# Patient Record
Sex: MALE | Race: WHITE | URBAN - METROPOLITAN AREA
[De-identification: names, ages, dates, MRNs, and addresses within clinical notes are randomized per-mention and may not be internally consistent; named-entity substitution may affect disease eponyms.]

---

## 2022-11-13 ENCOUNTER — APPOINTMENT (OUTPATIENT)
Dept: GENERAL RADIOLOGY | Age: 67
DRG: 070 | End: 2022-11-13
Payer: MEDICARE

## 2022-11-13 ENCOUNTER — HOSPITAL ENCOUNTER (INPATIENT)
Age: 67
LOS: 9 days | Discharge: HOME | DRG: 070 | End: 2022-11-22
Attending: EMERGENCY MEDICINE | Admitting: INTERNAL MEDICINE
Payer: MEDICARE

## 2022-11-13 ENCOUNTER — APPOINTMENT (OUTPATIENT)
Dept: CT IMAGING | Age: 67
DRG: 070 | End: 2022-11-13
Payer: MEDICARE

## 2022-11-13 DIAGNOSIS — E87.70 HYPERVOLEMIA, UNSPECIFIED HYPERVOLEMIA TYPE: Primary | ICD-10-CM

## 2022-11-13 DIAGNOSIS — N18.6 END STAGE RENAL DISEASE (HCC): ICD-10-CM

## 2022-11-13 PROBLEM — E87.5 HYPERKALEMIA: Status: ACTIVE | Noted: 2022-11-13

## 2022-11-13 PROBLEM — Z99.2 ADMISSION FOR DIALYSIS (HCC): Status: ACTIVE | Noted: 2022-11-13

## 2022-11-13 LAB
ABSOLUTE EOS #: <0.03 K/UL (ref 0–0.44)
ABSOLUTE IMMATURE GRANULOCYTE: 0.04 K/UL (ref 0–0.3)
ABSOLUTE LYMPH #: 2.12 K/UL (ref 1.1–3.7)
ABSOLUTE MONO #: 0.69 K/UL (ref 0.1–1.2)
ALBUMIN SERPL-MCNC: 4 G/DL (ref 3.5–5.2)
ALBUMIN/GLOBULIN RATIO: 1.1 (ref 1–2.5)
ALP BLD-CCNC: 122 U/L (ref 40–129)
ALT SERPL-CCNC: 52 U/L (ref 5–41)
ANION GAP SERPL CALCULATED.3IONS-SCNC: 25 MMOL/L (ref 9–17)
ANION GAP: 12 MMOL/L (ref 7–16)
AST SERPL-CCNC: 29 U/L
BASOPHILS # BLD: 0 % (ref 0–2)
BASOPHILS ABSOLUTE: 0.04 K/UL (ref 0–0.2)
BETA-HYDROXYBUTYRATE: 0.12 MMOL/L (ref 0.02–0.27)
BETA-HYDROXYBUTYRATE: 0.91 MMOL/L (ref 0.02–0.27)
BILIRUB SERPL-MCNC: 1 MG/DL (ref 0.3–1.2)
BUN BLDV-MCNC: 94 MG/DL (ref 8–23)
CALCIUM SERPL-MCNC: 8.3 MG/DL (ref 8.6–10.4)
CHLORIDE BLD-SCNC: 93 MMOL/L (ref 98–107)
CO2: 16 MMOL/L (ref 20–31)
CREAT SERPL-MCNC: 14.73 MG/DL (ref 0.7–1.2)
EGFR, POC: 4 ML/MIN/1.73M2
EOSINOPHILS RELATIVE PERCENT: 0 % (ref 1–4)
GFR SERPL CREATININE-BSD FRML MDRD: 3 ML/MIN/1.73M2
GLUCOSE BLD-MCNC: 161 MG/DL (ref 75–110)
GLUCOSE BLD-MCNC: 185 MG/DL
GLUCOSE BLD-MCNC: 185 MG/DL (ref 75–110)
GLUCOSE BLD-MCNC: 206 MG/DL
GLUCOSE BLD-MCNC: 206 MG/DL (ref 75–110)
GLUCOSE BLD-MCNC: 256 MG/DL
GLUCOSE BLD-MCNC: 256 MG/DL (ref 75–110)
GLUCOSE BLD-MCNC: 296 MG/DL (ref 74–100)
GLUCOSE BLD-MCNC: 332 MG/DL (ref 70–99)
HCO3 VENOUS: 18.6 MMOL/L (ref 22–29)
HCT VFR BLD CALC: 29.6 % (ref 40.7–50.3)
HEMOGLOBIN: 9.6 G/DL (ref 13–17)
IMMATURE GRANULOCYTES: 0 %
IRON SATURATION: 10 % (ref 20–55)
IRON: 27 UG/DL (ref 59–158)
LIPASE: 87 U/L (ref 13–60)
LYMPHOCYTES # BLD: 22 % (ref 24–43)
MCH RBC QN AUTO: 31.4 PG (ref 25.2–33.5)
MCHC RBC AUTO-ENTMCNC: 32.4 G/DL (ref 28.4–34.8)
MCV RBC AUTO: 96.7 FL (ref 82.6–102.9)
MONOCYTES # BLD: 7 % (ref 3–12)
MYOGLOBIN: 338 NG/ML (ref 28–72)
NEGATIVE BASE EXCESS, VEN: 6 (ref 0–2)
NRBC AUTOMATED: 0 PER 100 WBC
O2 SAT, VEN: 71 % (ref 60–85)
PCO2, VEN: 32.2 MM HG (ref 41–51)
PDW BLD-RTO: 14.4 % (ref 11.8–14.4)
PH VENOUS: 7.37 (ref 7.32–7.43)
PLATELET # BLD: 476 K/UL (ref 138–453)
PMV BLD AUTO: 9.2 FL (ref 8.1–13.5)
PO2, VEN: 37.8 MM HG (ref 30–50)
POC BUN: 105 MG/DL (ref 8–26)
POC CHLORIDE: 104 MMOL/L (ref 98–107)
POC CREATININE: 12.95 MG/DL (ref 0.51–1.19)
POC HEMATOCRIT: 31 % (ref 41–53)
POC HEMOGLOBIN: 10.4 G/DL (ref 13.5–17.5)
POC IONIZED CALCIUM: 0.9 MMOL/L (ref 1.15–1.33)
POC LACTIC ACID: 2.26 MMOL/L (ref 0.56–1.39)
POC POTASSIUM: 5.7 MMOL/L (ref 3.5–4.5)
POC SODIUM: 134 MMOL/L (ref 138–146)
POC TCO2: 19 MMOL/L (ref 22–30)
POTASSIUM SERPL-SCNC: 5.1 MMOL/L (ref 3.7–5.3)
POTASSIUM SERPL-SCNC: 5.8 MMOL/L (ref 3.7–5.3)
PRO-BNP: ABNORMAL PG/ML
RBC # BLD: 3.06 M/UL (ref 4.21–5.77)
SEG NEUTROPHILS: 71 % (ref 36–65)
SEGMENTED NEUTROPHILS ABSOLUTE COUNT: 6.78 K/UL (ref 1.5–8.1)
SODIUM BLD-SCNC: 134 MMOL/L (ref 135–144)
TOTAL CK: 235 U/L (ref 39–308)
TOTAL IRON BINDING CAPACITY: 266 UG/DL (ref 250–450)
TOTAL PROTEIN: 7.6 G/DL (ref 6.4–8.3)
TROPONIN, HIGH SENSITIVITY: 124 NG/L (ref 0–22)
TROPONIN, HIGH SENSITIVITY: 142 NG/L (ref 0–22)
UNSATURATED IRON BINDING CAPACITY: 239 UG/DL (ref 112–347)
WBC # BLD: 9.7 K/UL (ref 3.5–11.3)

## 2022-11-13 PROCEDURE — 99222 1ST HOSP IP/OBS MODERATE 55: CPT | Performed by: INTERNAL MEDICINE

## 2022-11-13 PROCEDURE — 82803 BLOOD GASES ANY COMBINATION: CPT

## 2022-11-13 PROCEDURE — 82010 KETONE BODYS QUAN: CPT

## 2022-11-13 PROCEDURE — 5A1D70Z PERFORMANCE OF URINARY FILTRATION, INTERMITTENT, LESS THAN 6 HOURS PER DAY: ICD-10-PCS | Performed by: INTERNAL MEDICINE

## 2022-11-13 PROCEDURE — 80307 DRUG TEST PRSMV CHEM ANLYZR: CPT

## 2022-11-13 PROCEDURE — 2580000003 HC RX 258: Performed by: STUDENT IN AN ORGANIZED HEALTH CARE EDUCATION/TRAINING PROGRAM

## 2022-11-13 PROCEDURE — 93005 ELECTROCARDIOGRAM TRACING: CPT

## 2022-11-13 PROCEDURE — 84132 ASSAY OF SERUM POTASSIUM: CPT

## 2022-11-13 PROCEDURE — 82947 ASSAY GLUCOSE BLOOD QUANT: CPT

## 2022-11-13 PROCEDURE — 80051 ELECTROLYTE PANEL: CPT

## 2022-11-13 PROCEDURE — 83540 ASSAY OF IRON: CPT

## 2022-11-13 PROCEDURE — 99285 EMERGENCY DEPT VISIT HI MDM: CPT

## 2022-11-13 PROCEDURE — 83690 ASSAY OF LIPASE: CPT

## 2022-11-13 PROCEDURE — 6370000000 HC RX 637 (ALT 250 FOR IP): Performed by: STUDENT IN AN ORGANIZED HEALTH CARE EDUCATION/TRAINING PROGRAM

## 2022-11-13 PROCEDURE — 85014 HEMATOCRIT: CPT

## 2022-11-13 PROCEDURE — 2500000003 HC RX 250 WO HCPCS

## 2022-11-13 PROCEDURE — 82565 ASSAY OF CREATININE: CPT

## 2022-11-13 PROCEDURE — 80053 COMPREHEN METABOLIC PANEL: CPT

## 2022-11-13 PROCEDURE — 90935 HEMODIALYSIS ONE EVALUATION: CPT

## 2022-11-13 PROCEDURE — 82330 ASSAY OF CALCIUM: CPT

## 2022-11-13 PROCEDURE — G0480 DRUG TEST DEF 1-7 CLASSES: HCPCS

## 2022-11-13 PROCEDURE — 99291 CRITICAL CARE FIRST HOUR: CPT | Performed by: INTERNAL MEDICINE

## 2022-11-13 PROCEDURE — 83605 ASSAY OF LACTIC ACID: CPT

## 2022-11-13 PROCEDURE — 83550 IRON BINDING TEST: CPT

## 2022-11-13 PROCEDURE — 82550 ASSAY OF CK (CPK): CPT

## 2022-11-13 PROCEDURE — 85025 COMPLETE CBC W/AUTO DIFF WBC: CPT

## 2022-11-13 PROCEDURE — 80179 DRUG ASSAY SALICYLATE: CPT

## 2022-11-13 PROCEDURE — 70450 CT HEAD/BRAIN W/O DYE: CPT

## 2022-11-13 PROCEDURE — 83874 ASSAY OF MYOGLOBIN: CPT

## 2022-11-13 PROCEDURE — 6360000002 HC RX W HCPCS: Performed by: STUDENT IN AN ORGANIZED HEALTH CARE EDUCATION/TRAINING PROGRAM

## 2022-11-13 PROCEDURE — 80143 DRUG ASSAY ACETAMINOPHEN: CPT

## 2022-11-13 PROCEDURE — 83880 ASSAY OF NATRIURETIC PEPTIDE: CPT

## 2022-11-13 PROCEDURE — 84520 ASSAY OF UREA NITROGEN: CPT

## 2022-11-13 PROCEDURE — 1200000000 HC SEMI PRIVATE

## 2022-11-13 PROCEDURE — 84484 ASSAY OF TROPONIN QUANT: CPT

## 2022-11-13 PROCEDURE — 71045 X-RAY EXAM CHEST 1 VIEW: CPT

## 2022-11-13 RX ORDER — LABETALOL HYDROCHLORIDE 5 MG/ML
10 INJECTION, SOLUTION INTRAVENOUS EVERY 6 HOURS PRN
Status: DISCONTINUED | OUTPATIENT
Start: 2022-11-13 | End: 2022-11-22 | Stop reason: HOSPADM

## 2022-11-13 RX ORDER — HEPARIN SODIUM 5000 [USP'U]/ML
5000 INJECTION, SOLUTION INTRAVENOUS; SUBCUTANEOUS EVERY 8 HOURS SCHEDULED
Status: DISCONTINUED | OUTPATIENT
Start: 2022-11-13 | End: 2022-11-22 | Stop reason: HOSPADM

## 2022-11-13 RX ORDER — INSULIN LISPRO 100 [IU]/ML
0-4 INJECTION, SOLUTION INTRAVENOUS; SUBCUTANEOUS NIGHTLY
Status: DISCONTINUED | OUTPATIENT
Start: 2022-11-13 | End: 2022-11-22 | Stop reason: HOSPADM

## 2022-11-13 RX ORDER — SODIUM CHLORIDE 9 MG/ML
INJECTION, SOLUTION INTRAVENOUS PRN
Status: DISCONTINUED | OUTPATIENT
Start: 2022-11-13 | End: 2022-11-22 | Stop reason: HOSPADM

## 2022-11-13 RX ORDER — ACETAMINOPHEN 325 MG/1
650 TABLET ORAL EVERY 6 HOURS PRN
Status: DISCONTINUED | OUTPATIENT
Start: 2022-11-13 | End: 2022-11-22 | Stop reason: HOSPADM

## 2022-11-13 RX ORDER — ACETAMINOPHEN 650 MG/1
650 SUPPOSITORY RECTAL EVERY 6 HOURS PRN
Status: DISCONTINUED | OUTPATIENT
Start: 2022-11-13 | End: 2022-11-22 | Stop reason: HOSPADM

## 2022-11-13 RX ORDER — ONDANSETRON 4 MG/1
4 TABLET, ORALLY DISINTEGRATING ORAL EVERY 8 HOURS PRN
Status: DISCONTINUED | OUTPATIENT
Start: 2022-11-13 | End: 2022-11-22 | Stop reason: HOSPADM

## 2022-11-13 RX ORDER — SODIUM CHLORIDE 0.9 % (FLUSH) 0.9 %
5-40 SYRINGE (ML) INJECTION PRN
Status: DISCONTINUED | OUTPATIENT
Start: 2022-11-13 | End: 2022-11-22 | Stop reason: HOSPADM

## 2022-11-13 RX ORDER — ASPIRIN 81 MG/1
81 TABLET, CHEWABLE ORAL AS NEEDED
Status: ON HOLD | COMMUNITY
End: 2022-11-22 | Stop reason: SDUPTHER

## 2022-11-13 RX ORDER — SODIUM CHLORIDE 0.9 % (FLUSH) 0.9 %
5-40 SYRINGE (ML) INJECTION EVERY 12 HOURS SCHEDULED
Status: DISCONTINUED | OUTPATIENT
Start: 2022-11-13 | End: 2022-11-22 | Stop reason: HOSPADM

## 2022-11-13 RX ORDER — DEXTROSE MONOHYDRATE 100 MG/ML
INJECTION, SOLUTION INTRAVENOUS CONTINUOUS PRN
Status: DISCONTINUED | OUTPATIENT
Start: 2022-11-13 | End: 2022-11-22 | Stop reason: HOSPADM

## 2022-11-13 RX ORDER — POLYETHYLENE GLYCOL 3350 17 G/17G
17 POWDER, FOR SOLUTION ORAL DAILY PRN
Status: DISCONTINUED | OUTPATIENT
Start: 2022-11-13 | End: 2022-11-22 | Stop reason: HOSPADM

## 2022-11-13 RX ORDER — INSULIN LISPRO 100 [IU]/ML
0-8 INJECTION, SOLUTION INTRAVENOUS; SUBCUTANEOUS
Status: DISCONTINUED | OUTPATIENT
Start: 2022-11-13 | End: 2022-11-22 | Stop reason: HOSPADM

## 2022-11-13 RX ORDER — ONDANSETRON 2 MG/ML
4 INJECTION INTRAMUSCULAR; INTRAVENOUS EVERY 6 HOURS PRN
Status: DISCONTINUED | OUTPATIENT
Start: 2022-11-13 | End: 2022-11-22 | Stop reason: HOSPADM

## 2022-11-13 RX ADMIN — DEXTROSE MONOHYDRATE 250 ML: 100 INJECTION, SOLUTION INTRAVENOUS at 11:38

## 2022-11-13 RX ADMIN — INSULIN LISPRO 4 UNITS: 100 INJECTION, SOLUTION INTRAVENOUS; SUBCUTANEOUS at 13:37

## 2022-11-13 RX ADMIN — Medication 10 MG: at 21:21

## 2022-11-13 RX ADMIN — INSULIN HUMAN 10 UNITS: 100 INJECTION, SOLUTION PARENTERAL at 11:39

## 2022-11-13 RX ADMIN — SODIUM CHLORIDE, PRESERVATIVE FREE 10 ML: 5 INJECTION INTRAVENOUS at 21:19

## 2022-11-13 RX ADMIN — HEPARIN SODIUM 5000 UNITS: 5000 INJECTION INTRAVENOUS; SUBCUTANEOUS at 17:26

## 2022-11-13 ASSESSMENT — ENCOUNTER SYMPTOMS
CONSTIPATION: 0
NAUSEA: 0
COUGH: 0
NAUSEA: 1
VOMITING: 0
SHORTNESS OF BREATH: 0
EYE PAIN: 0
VOMITING: 1
SHORTNESS OF BREATH: 1
VOICE CHANGE: 0
EYE REDNESS: 0
DIARRHEA: 0
CHEST TIGHTNESS: 0
FACIAL SWELLING: 0
BACK PAIN: 0
ABDOMINAL PAIN: 0
EYE DISCHARGE: 0
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
## 2022-11-13 NOTE — ED NOTES
Pt states he is feeling a bit better. Dialysis at bedside still on going.       Ruma Sanford RN  11/13/22 2823

## 2022-11-13 NOTE — ED NOTES
Patient attached to nasal cannulla at 2lpm, o2 sat is constantly on 80's.         Farley Cogan, RN  11/13/22 6089

## 2022-11-13 NOTE — ED NOTES
Nephrology at bedside      Gwen Whitfield [facility]  11/13/22 6427

## 2022-11-13 NOTE — ED NOTES
Pt report given to [facility].       Leatha Cox RN  11/13/22 8280

## 2022-11-13 NOTE — ED NOTES
Pt is moved to Rm 19 via stretcher. Call light given, no needs requested as of this time.       Emely Garibay RN  11/13/22 5782

## 2022-11-13 NOTE — PROGRESS NOTES
Dialysis Post Treatment Note  Vitals:    11/13/22 1750   BP: (!) 190/93   Pulse: (!) 115   Resp: 19   Temp: 99.2 °F (37.3 °C)   SpO2:      Pre-Weight = KENJI  Post-weight = Weight:  (KENJI (pt. is on a stretcher in ER room))  Total Liters Processed = Blood Volume Processed (Liters): 79.5 l/min  Rinseback Volume (mL) = Rinseback Volume (ml): 300 ml  Net Removal (mL) =  3000mL  Patient's dry weight=81kg  Type of access used=AVF left  Length of treatment=210mins    Pt. Tolerated HD tx. Able to remove 3L of fluid as per orders. No adverse events noted pre, intra, and post HD treatment. Post HD access care rendered. Pt. Remained in ER bed post HD, but is for admission awaiting room. Report given to nurse Evelyn Mackey RN of ER.

## 2022-11-13 NOTE — ED NOTES
Pt insisted to use the bathroom, ambulated with assistance and using his cane.,     Rex Tirado RN  11/13/22 1811

## 2022-11-13 NOTE — ED TRIAGE NOTES
Pt to ED with c/o of high blood sugar. Pt has a fistula on lefr arm and on dialysis 3x/wk as reported. Pt has not been dialyzed for over a week. Pt reported he is insulin dependent diabetic, hypertensive and has history of stroke. Pt is a non-smoker, has history of heart bypass.

## 2022-11-13 NOTE — ED NOTES
Pt changes into hospital gown and provided fresh warm blanket. Box lunch given as requested.       Yasmeen Lechuga RN  11/13/22 1038

## 2022-11-13 NOTE — ED PROVIDER NOTES
[address] ED     Emergency Department     Faculty Attestation        I performed a history and physical examination of the patient and discussed management with the resident. I reviewed the residents note and agree with the documented findings and plan of care. Any areas of disagreement are noted on the chart. I was personally present for the key portions of any procedures. I have documented in the chart those procedures where I was not present during the key portions. I have reviewed the emergency nurses triage note. I agree with the chief complaint, past medical history, past surgical history, allergies, medications, social and family history as documented unless otherwise noted below. For Physician Assistant/ Nurse Practitioner cases/documentation I have personally evaluated this patient and have completed at least one if not all key elements of the E/M (history, physical exam, and MDM). Additional findings are as noted. Vital Signs: BP: (!) 174/81  Heart Rate: 92  Resp: 21  Temp: 98.4 °F (36.9 °C) SpO2: 97 %  PCP:  No primary care provider on file. Pertinent Comments:           EKG Interpretation    Interpreted by emergency department physician    Rhythm: normal sinus   Rate: normal at 93 bpm  Axis: normal  Conduction: normal  ST Segments: no acute change  T Waves: Lateral T wave inversions  Q Waves: no acute change    Clinical Impression:  nonspecific EKG. Critical Care  None      (Please note that portions of this note were completed with a voice recognition program. Efforts were made to edit the dictations but occasionally words are mis-transcribed.  Whenever words are used in this note in any gender, they shall be construed as though they were used in the gender appropriate to the circumstances; and whenever words are used in this note in the singular or plural form, they shall be construed as though they were used in the form appropriate to the circumstances.)    MD Juan Miguel Melendez  Attending Emergency Medicine Physician            Eliz Rose MD  11/13/22 0897
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
## 2022-11-13 NOTE — ED NOTES
Critical results of BUN 94, CREA 14.73. Resident notified.       Michael Lazcano, MACHO  11/13/22 3156

## 2022-11-13 NOTE — PROGRESS NOTES
Dialysis Time Out  To be done by RN and tech or 2 RNs  Staff Names Jacinto Ureña RN / Lasha Hill RN    [x]  Identity of the patient using 2 patient identifiers  [x]  Consent for treatment  [x]  Equipment-proper machine and dialyzer  [x]  B-Hep B status  [x]  Orders- to include bath, blood flow, dialyzer, time and fluid removal  [x]  Access-Correct site and in working order  [x]  Time for patient to ask questions.
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
## 2022-11-13 NOTE — ED NOTES
Pt also complains of weakness on lower extremities bilateral, denies tingling sensation. Bilateral pitting edema noted on both legs.       Heide Jennings RN  11/13/22 1624 [place] Pelon Mckeon RN  11/13/22 0380

## 2022-11-13 NOTE — ED NOTES
Internal medicine at bedside to evaluate pt. Pt is not cooperating with examination, he is sarcastic, will not provide factual information and is making it hard to evaluate mental status. Pt refusing to answer specific questions and has demanding personality.      Cristhian Ruggiero RN  11/13/22 0033

## 2022-11-14 PROBLEM — R79.89 ELEVATED BRAIN NATRIURETIC PEPTIDE (BNP) LEVEL: Status: ACTIVE | Noted: 2022-11-14

## 2022-11-14 PROBLEM — G93.41 ACUTE METABOLIC ENCEPHALOPATHY: Status: ACTIVE | Noted: 2022-11-14

## 2022-11-14 PROBLEM — E11.40 DIABETIC NEUROPATHY (HCC): Status: ACTIVE | Noted: 2022-11-14

## 2022-11-14 PROBLEM — I10 PRIMARY HYPERTENSION: Status: ACTIVE | Noted: 2022-11-14

## 2022-11-14 PROBLEM — G93.49 UREMIC ENCEPHALOPATHY: Status: ACTIVE | Noted: 2022-11-14

## 2022-11-14 PROBLEM — Z79.4 INSULIN DEPENDENT TYPE 2 DIABETES MELLITUS (HCC): Status: ACTIVE | Noted: 2022-11-14

## 2022-11-14 PROBLEM — D50.9 IRON DEFICIENCY ANEMIA: Status: ACTIVE | Noted: 2022-11-14

## 2022-11-14 PROBLEM — N19 UREMIA: Status: ACTIVE | Noted: 2022-11-14

## 2022-11-14 PROBLEM — E11.9 INSULIN DEPENDENT TYPE 2 DIABETES MELLITUS (HCC): Status: ACTIVE | Noted: 2022-11-14

## 2022-11-14 PROBLEM — N19 UREMIC ENCEPHALOPATHY: Status: ACTIVE | Noted: 2022-11-14

## 2022-11-14 LAB
ABSOLUTE EOS #: 0.27 K/UL (ref 0–0.44)
ABSOLUTE IMMATURE GRANULOCYTE: <0.03 K/UL (ref 0–0.3)
ABSOLUTE LYMPH #: 2.05 K/UL (ref 1.1–3.7)
ABSOLUTE MONO #: 0.61 K/UL (ref 0.1–1.2)
AMMONIA: 28 UMOL/L (ref 16–60)
AMPHETAMINE SCREEN URINE: NEGATIVE
ANION GAP SERPL CALCULATED.3IONS-SCNC: 19 MMOL/L (ref 9–17)
BARBITURATE SCREEN URINE: NEGATIVE
BASOPHILS # BLD: 1 % (ref 0–2)
BASOPHILS ABSOLUTE: 0.05 K/UL (ref 0–0.2)
BENZODIAZEPINE SCREEN, URINE: NEGATIVE
BUN BLDV-MCNC: 44 MG/DL (ref 8–23)
CALCIUM SERPL-MCNC: 8.8 MG/DL (ref 8.6–10.4)
CANNABINOID SCREEN URINE: NEGATIVE
CHLORIDE BLD-SCNC: 92 MMOL/L (ref 98–107)
CO2: 25 MMOL/L (ref 20–31)
COCAINE METABOLITE, URINE: NEGATIVE
CREAT SERPL-MCNC: 8.77 MG/DL (ref 0.7–1.2)
EOSINOPHILS RELATIVE PERCENT: 4 % (ref 1–4)
FENTANYL URINE: NEGATIVE
GFR SERPL CREATININE-BSD FRML MDRD: 6 ML/MIN/1.73M2
GLUCOSE BLD-MCNC: 163 MG/DL (ref 70–99)
GLUCOSE BLD-MCNC: 176 MG/DL (ref 75–110)
GLUCOSE BLD-MCNC: 232 MG/DL (ref 75–110)
GLUCOSE BLD-MCNC: 254 MG/DL (ref 75–110)
GLUCOSE BLD-MCNC: 254 MG/DL (ref 75–110)
HCT VFR BLD CALC: 29.7 % (ref 40.7–50.3)
HEMOGLOBIN: 10 G/DL (ref 13–17)
IMMATURE GRANULOCYTES: 0 %
INR BLD: 1
LACTIC ACID, SEPSIS WHOLE BLOOD: 2.3 MMOL/L (ref 0.5–1.9)
LYMPHOCYTES # BLD: 32 % (ref 24–43)
MCH RBC QN AUTO: 32.2 PG (ref 25.2–33.5)
MCHC RBC AUTO-ENTMCNC: 33.7 G/DL (ref 28.4–34.8)
MCV RBC AUTO: 95.5 FL (ref 82.6–102.9)
METHADONE SCREEN, URINE: NEGATIVE
MONOCYTES # BLD: 10 % (ref 3–12)
NRBC AUTOMATED: 0 PER 100 WBC
OPIATES, URINE: NEGATIVE
OXYCODONE SCREEN URINE: NEGATIVE
PDW BLD-RTO: 13.9 % (ref 11.8–14.4)
PHENCYCLIDINE, URINE: NEGATIVE
PLATELET # BLD: 437 K/UL (ref 138–453)
PMV BLD AUTO: 9.1 FL (ref 8.1–13.5)
POTASSIUM SERPL-SCNC: 4 MMOL/L (ref 3.7–5.3)
PROTHROMBIN TIME: 11.1 SEC (ref 9.1–12.3)
RBC # BLD: 3.11 M/UL (ref 4.21–5.77)
SEG NEUTROPHILS: 53 % (ref 36–65)
SEGMENTED NEUTROPHILS ABSOLUTE COUNT: 3.42 K/UL (ref 1.5–8.1)
SODIUM BLD-SCNC: 136 MMOL/L (ref 135–144)
TEST INFORMATION: NORMAL
WBC # BLD: 6.4 K/UL (ref 3.5–11.3)

## 2022-11-14 PROCEDURE — 2580000003 HC RX 258: Performed by: STUDENT IN AN ORGANIZED HEALTH CARE EDUCATION/TRAINING PROGRAM

## 2022-11-14 PROCEDURE — 36415 COLL VENOUS BLD VENIPUNCTURE: CPT

## 2022-11-14 PROCEDURE — 82947 ASSAY GLUCOSE BLOOD QUANT: CPT

## 2022-11-14 PROCEDURE — 6360000002 HC RX W HCPCS: Performed by: STUDENT IN AN ORGANIZED HEALTH CARE EDUCATION/TRAINING PROGRAM

## 2022-11-14 PROCEDURE — 1200000000 HC SEMI PRIVATE

## 2022-11-14 PROCEDURE — 83605 ASSAY OF LACTIC ACID: CPT

## 2022-11-14 PROCEDURE — 6370000000 HC RX 637 (ALT 250 FOR IP)

## 2022-11-14 PROCEDURE — 80307 DRUG TEST PRSMV CHEM ANLYZR: CPT

## 2022-11-14 PROCEDURE — 6370000000 HC RX 637 (ALT 250 FOR IP): Performed by: STUDENT IN AN ORGANIZED HEALTH CARE EDUCATION/TRAINING PROGRAM

## 2022-11-14 PROCEDURE — 80048 BASIC METABOLIC PNL TOTAL CA: CPT

## 2022-11-14 PROCEDURE — 82140 ASSAY OF AMMONIA: CPT

## 2022-11-14 PROCEDURE — 6370000000 HC RX 637 (ALT 250 FOR IP): Performed by: INTERNAL MEDICINE

## 2022-11-14 PROCEDURE — 85025 COMPLETE CBC W/AUTO DIFF WBC: CPT

## 2022-11-14 PROCEDURE — 90935 HEMODIALYSIS ONE EVALUATION: CPT

## 2022-11-14 PROCEDURE — 85610 PROTHROMBIN TIME: CPT

## 2022-11-14 PROCEDURE — 90935 HEMODIALYSIS ONE EVALUATION: CPT | Performed by: INTERNAL MEDICINE

## 2022-11-14 PROCEDURE — 99233 SBSQ HOSP IP/OBS HIGH 50: CPT | Performed by: INTERNAL MEDICINE

## 2022-11-14 RX ORDER — AMLODIPINE BESYLATE 10 MG/1
10 TABLET ORAL DAILY
Status: DISCONTINUED | OUTPATIENT
Start: 2022-11-14 | End: 2022-11-22 | Stop reason: HOSPADM

## 2022-11-14 RX ORDER — CARVEDILOL 12.5 MG/1
12.5 TABLET ORAL 2 TIMES DAILY WITH MEALS
Status: DISCONTINUED | OUTPATIENT
Start: 2022-11-14 | End: 2022-11-14

## 2022-11-14 RX ORDER — CARVEDILOL 25 MG/1
25 TABLET ORAL 2 TIMES DAILY WITH MEALS
Status: DISCONTINUED | OUTPATIENT
Start: 2022-11-14 | End: 2022-11-22 | Stop reason: HOSPADM

## 2022-11-14 RX ORDER — LANOLIN ALCOHOL/MO/W.PET/CERES
325 CREAM (GRAM) TOPICAL
Status: DISCONTINUED | OUTPATIENT
Start: 2022-11-14 | End: 2022-11-22 | Stop reason: HOSPADM

## 2022-11-14 RX ADMIN — CARVEDILOL 12.5 MG: 12.5 TABLET, FILM COATED ORAL at 08:44

## 2022-11-14 RX ADMIN — INSULIN LISPRO 4 UNITS: 100 INJECTION, SOLUTION INTRAVENOUS; SUBCUTANEOUS at 17:32

## 2022-11-14 RX ADMIN — SODIUM CHLORIDE, PRESERVATIVE FREE 10 ML: 5 INJECTION INTRAVENOUS at 21:15

## 2022-11-14 RX ADMIN — FERROUS SULFATE TAB EC 325 MG (65 MG FE EQUIVALENT) 325 MG: 325 (65 FE) TABLET DELAYED RESPONSE at 08:44

## 2022-11-14 RX ADMIN — SODIUM CHLORIDE, PRESERVATIVE FREE 10 ML: 5 INJECTION INTRAVENOUS at 08:45

## 2022-11-14 RX ADMIN — INSULIN LISPRO 2 UNITS: 100 INJECTION, SOLUTION INTRAVENOUS; SUBCUTANEOUS at 08:47

## 2022-11-14 RX ADMIN — CARVEDILOL 25 MG: 25 TABLET, FILM COATED ORAL at 17:32

## 2022-11-14 RX ADMIN — HEPARIN SODIUM 5000 UNITS: 5000 INJECTION INTRAVENOUS; SUBCUTANEOUS at 05:58

## 2022-11-14 RX ADMIN — AMLODIPINE BESYLATE 10 MG: 10 TABLET ORAL at 08:44
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
## 2022-11-14 NOTE — PROGRESS NOTES
Dialysis Post Treatment Note  Vitals:    11/14/22 1324   BP: (!) 162/84   Pulse: 91   Resp: 16   Temp: 97.4 °F (36.3 °C)   SpO2:      Pre-Weight = 79.1 kg  Post-weight = Weight: 171 lb 1.2 oz (77.6 kg)  Total Liters Processed = Blood Volume Processed (Liters): 300 l/min  Rinseback Volume (mL) = Rinseback Volume (ml): 75.9 ml  Net Removal (mL) =  1.5L   Patient's dry weight=  Type of access used= LUE Fistula   Length of treatment=3 hrs    Pt tolerated tx well today with no complaints. Pt was seen bedside today by Dr. Ricky Cordova today with no changes to his orders. Pt discharged back to room via transportation.

## 2022-11-14 NOTE — PLAN OF CARE
Problem: ABCDS Injury Assessment  Goal: Absence of physical injury  11/14/2022 0543 by Alondra Richter RN  Outcome: Progressing  11/13/2022 1854 by Natalee Payton RN  Outcome: Progressing     Problem: Safety - Adult  Goal: Free from fall injury  11/14/2022 0543 by Alondra Richter RN  Outcome: Progressing  11/13/2022 1854 by Natalee Payton RN  Outcome: Progressing     Problem: Skin/Tissue Integrity  Goal: Absence of new skin breakdown  Description: 1. Monitor for areas of redness and/or skin breakdown  2. Assess vascular access sites hourly  3. Every 4-6 hours minimum:  Change oxygen saturation probe site  4. Every 4-6 hours:  If on nasal continuous positive airway pressure, respiratory therapy assess nares and determine need for appliance change or resting period.   Outcome: Progressing

## 2022-11-14 NOTE — PROGRESS NOTES
Dialysis Time Out  To be done by RN and tech or 2 RNs  Staff Names Gillian Augustin RN    [x]  Identity of the patient using 2 patient identifiers  [x]  Consent for treatment  [x]  Equipment-proper machine and dialyzer  [x]  B-Hep B status  [x]  Orders- to include bath, blood flow, dialyzer, time and fluid removal  [x]  Access-Correct site and in working order  [x]  Time for patient to ask questions.
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
## 2022-11-14 NOTE — PLAN OF CARE
Problem: ABCDS Injury Assessment  Goal: Absence of physical injury  11/14/2022 1645 by Juan Warren RN  Outcome: Progressing  11/14/2022 0543 by Macario Asif RN  Outcome: Progressing     Problem: Safety - Adult  Goal: Free from fall injury  11/14/2022 1645 by Juan Warren RN  Outcome: Progressing  11/14/2022 0543 by Macario Asif RN  Outcome: Progressing     Problem: Skin/Tissue Integrity  Goal: Absence of new skin breakdown  Description: 1. Monitor for areas of redness and/or skin breakdown  2. Assess vascular access sites hourly  3. Every 4-6 hours minimum:  Change oxygen saturation probe site  4. Every 4-6 hours:  If on nasal continuous positive airway pressure, respiratory therapy assess nares and determine need for appliance change or resting period.   11/14/2022 1645 by Juan Warren RN  Outcome: Progressing  11/14/2022 0543 by Macario Asif RN  Outcome: Progressing     Problem: Chronic Conditions and Co-morbidities  Goal: Patient's chronic conditions and co-morbidity symptoms are monitored and maintained or improved  Outcome: Progressing
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
## 2022-11-14 NOTE — PROGRESS NOTES
Physical Therapy        Physical Therapy Cancel Note      DATE: 2022    NAME: Tamiko Cummings  MRN: [id number]   : 1955      Patient not seen this date for Physical Therapy due to:    Hemodialysis:Out of room at dialysis upon PT checking in.  Ck 11/15      Electronically signed by Jn Clark PT on 2022 at 3:33 PM

## 2022-11-14 NOTE — PROGRESS NOTES
Renal Progress Note    Patient :  Rosmery Anderson; 73 y.o. MRN# [id number]  Location:  [facility]  Attending:  Deonna Jean MD  Admit Date:  2022   Hospital Day: 1      Subjective:       Patient is seen and examined at bedside. No significant event overnight. Patient has a hemodialysis via left aVF yesterday with net removal of 3 L 4 to 10 minutes. Tolerated procedure well. Blood pressure today is 150/86, pulse 100 on room air. Labs reviewed and evaluated. Sodium 136 potassium 4.0 chloride 92. Hemoglobin 10.0-stable. Outpatient Medications:     Medications Prior to Admission: aspirin 81 MG chewable tablet, Take 81 mg by mouth as needed for Pain    Current Medications:     Scheduled Meds:    carvedilol  12.5 mg Oral BID WC    amLODIPine  10 mg Oral Daily    ferrous sulfate  325 mg Oral Daily with breakfast    sodium chloride flush  5-40 mL IntraVENous 2 times per day    heparin (porcine)  5,000 Units SubCUTAneous 3 times per day    insulin lispro  0-8 Units SubCUTAneous TID WC    insulin lispro  0-4 Units SubCUTAneous Nightly     Continuous Infusions:    dextrose      sodium chloride       PRN Meds:  glucose, dextrose bolus **OR** dextrose bolus, glucagon (rDNA), dextrose, sodium chloride flush, sodium chloride, ondansetron **OR** ondansetron, polyethylene glycol, acetaminophen **OR** acetaminophen, labetalol    Input/Output:       I/O last 3 completed shifts: In: 600 [P.O.:300]  Out: 8681 [Urine:175].     Patient Vitals for the past 96 hrs (Last 3 readings):   Weight   22 1836 176 lb 12.9 oz (80.2 kg)       Vital Signs:   Temperature:  Temp: 98.2 °F (36.8 °C)  TMax:   Temp (24hrs), Av.9 °F (37.2 °C), Min:98.2 °F (36.8 °C), Max:99.2 °F (37.3 °C)    Respirations:  Resp: 16  Pulse:   Heart Rate: 96  BP:    BP: (!) 155/82  BP Range: Systolic (39OFW), OIN:931 , Min:146 , FHF:261       Diastolic (60CPG), CLW:37, Min:76, Max:104      Physical Examination:     General:  AAO x 3, speaking in full sentences, no accessory muscle use. HEENT: Atraumatic, normocephalic, no throat congestion, moist mucosa. Eyes:   Pupils equal, round and reactive to light, EOMI. Neck:   Supple  Chest:   Bilateral vesicular breath sounds, no rales or wheezes. Cardiac:  S1 S2 RR, no murmurs, gallops or rubs. Abdomen: Soft, non-tender, no masses or organomegaly, BS audible. :   No suprapubic or flank tenderness. Neuro:  AAO x 3, No FND. SKIN:  No rashes, good skin turgor. Extremities:  No edema. Labs:       Recent Labs     11/13/22  0920 11/14/22  0451   WBC 9.7 6.4   RBC 3.06* 3.11*   HGB 9.6* 10.0*   HCT 29.6* 29.7*   MCV 96.7 95.5   MCH 31.4 32.2   MCHC 32.4 33.7   RDW 14.4 13.9   * 437   MPV 9.2 9.1      BMP:   Recent Labs     11/13/22  0912 11/13/22  0920 11/13/22  1244 11/13/22  1414 11/13/22  1530 11/13/22  1643 11/14/22  0451   NA  --  134*  --   --   --   --  136   K  --  5.8*  --  5.1  --   --  4.0   CL  --  93*  --   --   --   --  92*   CO2  --  16*  --   --   --   --  25   BUN  --  94*  --   --   --   --  44*   CREATININE 12.95* 14.73*  --   --   --   --  8.77*   GLUCOSE  --  332*   < >  --  206 185 163*   CALCIUM  --  8.3*  --   --   --   --  8.8    < > = values in this interval not displayed. Phosphorus:   No results for input(s): PHOS in the last 72 hours. Magnesium:  No results for input(s): MG in the last 72 hours. Albumin:    Recent Labs     11/13/22  0920   LABALBU 4.0     BNP:    No results found for: BNP  KEN:    No results found for: KEN  SPEP:  Lab Results   Component Value Date/Time    PROT 7.6 11/13/2022 09:20 AM       Urinalysis/Chemistries:    No results found for: Florajacky Fast, PHUR, LABCAST, [address], [address], MUCUS, TRICHOMONAS, YEAST, BACTERIA, CLARITYU, SPECGRAV, LEUKOCYTESUR, UROBILINOGEN, BILIRUBINUR, BLOODU, GLUCOSEU, [address] [facility], [address] [place] [address]    Radiology:     Reviewed. Assessment:     1. ESRD on Hemodialysis.  His regular HD days are Monday Wednesday Friday Hemodialysis facility in [place], he is unsure of the name. He is using left upper arm fistula. He is unsure of the name of his nephrologist.   He has HD yesterday with net removal of 3 L for 3 and half hours    2. Anemia of chronic disease  3. Secondary hyperparathyroidism  4. Hypertension  5. Diabetes type 2  6. Hyperkalemia-improved    Plan:   1.  HD per schedule MWF.  2.  Strict I's and O's with daily weight. 3.  Low potassium low phosphorus diet. Fluid restriction to 1200 mL/day. 4.  We will follow with you. Nutrition   Please ensure that patient is on a renal diet/TF. Avoid nephrotoxic drugs/contrast exposure. Irina Ray MD  Internal Medicine Resident, PGY-2  [address]  11/14/2022,9:18 AM    This note is created with the assistance of a speech-recognition program. While intending to generate a document that actually reflects the content of the visit, no guarantees can be provided that every mistake has been identified and corrected by editing. Attending Physician Statement  I have discussed the care of Vicki Kumar, including pertinent history and exam findings with the resident/fellow. I have reviewed the key elements of all parts of the encounter with the resident/fellow. I have seen and examined the patient with the resident/fellow. I have seen and examined the patent on hemodialysis. I have reviewed the dialysis prescription with dialysis personnel in the dialysis unit. I agree with the assessment and plan and status of the problem list as documented. Addiitionally I recommend continuing a Monday and Wednesday and Friday hemodialysis schedule.     Delilah Salmon MD   Nephrology Attending Physician  [facility]  11/14/2022
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
## 2022-11-15 PROBLEM — R11.2 NAUSEA AND VOMITING: Status: ACTIVE | Noted: 2022-11-15

## 2022-11-15 PROBLEM — Z99.2 ESRD ON DIALYSIS (HCC): Status: ACTIVE | Noted: 2022-11-13

## 2022-11-15 PROBLEM — D63.8 ANEMIA OF CHRONIC DISEASE: Status: ACTIVE | Noted: 2022-11-15

## 2022-11-15 LAB
ABSOLUTE EOS #: 0.67 K/UL (ref 0–0.44)
ABSOLUTE IMMATURE GRANULOCYTE: <0.03 K/UL (ref 0–0.3)
ABSOLUTE LYMPH #: 2.45 K/UL (ref 1.1–3.7)
ABSOLUTE MONO #: 0.7 K/UL (ref 0.1–1.2)
ANION GAP SERPL CALCULATED.3IONS-SCNC: 13 MMOL/L (ref 9–17)
BASOPHILS # BLD: 1 % (ref 0–2)
BASOPHILS ABSOLUTE: 0.06 K/UL (ref 0–0.2)
BUN BLDV-MCNC: 35 MG/DL (ref 8–23)
CALCIUM SERPL-MCNC: 7.5 MG/DL (ref 8.6–10.4)
CHLORIDE BLD-SCNC: 97 MMOL/L (ref 98–107)
CO2: 27 MMOL/L (ref 20–31)
CREAT SERPL-MCNC: 7.08 MG/DL (ref 0.7–1.2)
EKG ATRIAL RATE: 93 BPM
EKG P AXIS: 43 DEGREES
EKG P-R INTERVAL: 206 MS
EKG Q-T INTERVAL: 394 MS
EKG QRS DURATION: 106 MS
EKG QTC CALCULATION (BAZETT): 489 MS
EKG R AXIS: 1 DEGREES
EKG T AXIS: 107 DEGREES
EKG VENTRICULAR RATE: 93 BPM
EOSINOPHILS RELATIVE PERCENT: 11 % (ref 1–4)
GFR SERPL CREATININE-BSD FRML MDRD: 8 ML/MIN/1.73M2
GLUCOSE BLD-MCNC: 156 MG/DL (ref 75–110)
GLUCOSE BLD-MCNC: 165 MG/DL (ref 70–99)
GLUCOSE BLD-MCNC: 200 MG/DL (ref 75–110)
GLUCOSE BLD-MCNC: 202 MG/DL (ref 75–110)
GLUCOSE BLD-MCNC: 206 MG/DL (ref 75–110)
HCT VFR BLD CALC: 26.3 % (ref 40.7–50.3)
HEMOGLOBIN: 8.7 G/DL (ref 13–17)
IMMATURE GRANULOCYTES: 0 %
LV EF: 48 %
LVEF MODALITY: NORMAL
LYMPHOCYTES # BLD: 39 % (ref 24–43)
MCH RBC QN AUTO: 31.6 PG (ref 25.2–33.5)
MCHC RBC AUTO-ENTMCNC: 33.1 G/DL (ref 28.4–34.8)
MCV RBC AUTO: 95.6 FL (ref 82.6–102.9)
MONOCYTES # BLD: 11 % (ref 3–12)
NRBC AUTOMATED: 0 PER 100 WBC
PDW BLD-RTO: 13.3 % (ref 11.8–14.4)
PLATELET # BLD: 391 K/UL (ref 138–453)
PMV BLD AUTO: 9 FL (ref 8.1–13.5)
POTASSIUM SERPL-SCNC: 4.3 MMOL/L (ref 3.7–5.3)
RBC # BLD: 2.75 M/UL (ref 4.21–5.77)
SEG NEUTROPHILS: 38 % (ref 36–65)
SEGMENTED NEUTROPHILS ABSOLUTE COUNT: 2.36 K/UL (ref 1.5–8.1)
SODIUM BLD-SCNC: 137 MMOL/L (ref 135–144)
WBC # BLD: 6.3 K/UL (ref 3.5–11.3)

## 2022-11-15 PROCEDURE — 80048 BASIC METABOLIC PNL TOTAL CA: CPT

## 2022-11-15 PROCEDURE — 6360000002 HC RX W HCPCS: Performed by: STUDENT IN AN ORGANIZED HEALTH CARE EDUCATION/TRAINING PROGRAM

## 2022-11-15 PROCEDURE — 6370000000 HC RX 637 (ALT 250 FOR IP): Performed by: STUDENT IN AN ORGANIZED HEALTH CARE EDUCATION/TRAINING PROGRAM

## 2022-11-15 PROCEDURE — 85025 COMPLETE CBC W/AUTO DIFF WBC: CPT

## 2022-11-15 PROCEDURE — 93306 TTE W/DOPPLER COMPLETE: CPT

## 2022-11-15 PROCEDURE — 6370000000 HC RX 637 (ALT 250 FOR IP): Performed by: INTERNAL MEDICINE

## 2022-11-15 PROCEDURE — 82947 ASSAY GLUCOSE BLOOD QUANT: CPT

## 2022-11-15 PROCEDURE — 6370000000 HC RX 637 (ALT 250 FOR IP)

## 2022-11-15 PROCEDURE — 97162 PT EVAL MOD COMPLEX 30 MIN: CPT

## 2022-11-15 PROCEDURE — 36415 COLL VENOUS BLD VENIPUNCTURE: CPT

## 2022-11-15 PROCEDURE — 97165 OT EVAL LOW COMPLEX 30 MIN: CPT

## 2022-11-15 PROCEDURE — 99232 SBSQ HOSP IP/OBS MODERATE 35: CPT | Performed by: INTERNAL MEDICINE

## 2022-11-15 PROCEDURE — 93010 ELECTROCARDIOGRAM REPORT: CPT | Performed by: INTERNAL MEDICINE

## 2022-11-15 PROCEDURE — 97530 THERAPEUTIC ACTIVITIES: CPT

## 2022-11-15 PROCEDURE — 2580000003 HC RX 258: Performed by: STUDENT IN AN ORGANIZED HEALTH CARE EDUCATION/TRAINING PROGRAM

## 2022-11-15 PROCEDURE — 99233 SBSQ HOSP IP/OBS HIGH 50: CPT | Performed by: INTERNAL MEDICINE

## 2022-11-15 PROCEDURE — 1200000000 HC SEMI PRIVATE

## 2022-11-15 PROCEDURE — 97535 SELF CARE MNGMENT TRAINING: CPT

## 2022-11-15 RX ORDER — ASPIRIN 81 MG/1
81 TABLET, CHEWABLE ORAL DAILY
Status: DISCONTINUED | OUTPATIENT
Start: 2022-11-15 | End: 2022-11-22 | Stop reason: HOSPADM

## 2022-11-15 RX ADMIN — INSULIN LISPRO 2 UNITS: 100 INJECTION, SOLUTION INTRAVENOUS; SUBCUTANEOUS at 17:41

## 2022-11-15 RX ADMIN — INSULIN LISPRO 2 UNITS: 100 INJECTION, SOLUTION INTRAVENOUS; SUBCUTANEOUS at 13:34

## 2022-11-15 RX ADMIN — ASPIRIN 81 MG: 81 TABLET, CHEWABLE ORAL at 17:41

## 2022-11-15 RX ADMIN — HEPARIN SODIUM 5000 UNITS: 5000 INJECTION INTRAVENOUS; SUBCUTANEOUS at 06:21

## 2022-11-15 RX ADMIN — HEPARIN SODIUM 5000 UNITS: 5000 INJECTION INTRAVENOUS; SUBCUTANEOUS at 13:34

## 2022-11-15 RX ADMIN — FERROUS SULFATE TAB EC 325 MG (65 MG FE EQUIVALENT) 325 MG: 325 (65 FE) TABLET DELAYED RESPONSE at 10:02

## 2022-11-15 RX ADMIN — AMLODIPINE BESYLATE 10 MG: 10 TABLET ORAL at 10:02

## 2022-11-15 RX ADMIN — CARVEDILOL 25 MG: 25 TABLET, FILM COATED ORAL at 17:41

## 2022-11-15 RX ADMIN — SODIUM CHLORIDE, PRESERVATIVE FREE 10 ML: 5 INJECTION INTRAVENOUS at 10:03

## 2022-11-15 RX ADMIN — CARVEDILOL 25 MG: 25 TABLET, FILM COATED ORAL at 10:02

## 2022-11-15 RX ADMIN — SODIUM CHLORIDE, PRESERVATIVE FREE 10 ML: 5 INJECTION INTRAVENOUS at 19:56

## 2022-11-15 RX ADMIN — HEPARIN SODIUM 5000 UNITS: 5000 INJECTION INTRAVENOUS; SUBCUTANEOUS at 21:46
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
## 2022-11-15 NOTE — PROGRESS NOTES
Renal Progress Note    Patient :  Minal Oglesby; 73 y.o. MRN# [id number]  Location:  [facility]  Attending:  Richard Eller MD  Admit Date:  2022   Hospital Day: 2      Subjective:       Patient is seen and examined at bedside. No significant event overnight. Patient had HD via left aVF yesterday with net removal of 3 L. Normally gets HD per F schedule. Tolerated procedure well. Blood pressure today is 136/80, pulse 87 on room air. Labs reviewed and evaluated. Sodium 137 potassium 4.3 chloride 97. Creatinine 7.08 , BUN 35  Hemoglobin 8.7    Outpatient Medications:     Medications Prior to Admission: aspirin 81 MG chewable tablet, Take 81 mg by mouth as needed for Pain    Current Medications:     Scheduled Meds:    amLODIPine  10 mg Oral Daily    ferrous sulfate  325 mg Oral Daily with breakfast    carvedilol  25 mg Oral BID WC    sodium chloride flush  5-40 mL IntraVENous 2 times per day    heparin (porcine)  5,000 Units SubCUTAneous 3 times per day    insulin lispro  0-8 Units SubCUTAneous TID WC    insulin lispro  0-4 Units SubCUTAneous Nightly     Continuous Infusions:    dextrose      sodium chloride       PRN Meds:  hypromellose, glucose, dextrose bolus **OR** dextrose bolus, glucagon (rDNA), dextrose, sodium chloride flush, sodium chloride, ondansetron **OR** ondansetron, polyethylene glycol, acetaminophen **OR** acetaminophen, labetalol    Input/Output:       I/O last 3 completed shifts: In: 600 [P.O.:300]  Out: 1975 [Urine:175].     Patient Vitals for the past 96 hrs (Last 3 readings):   Weight   22 1324 171 lb 1.2 oz (77.6 kg)   22 1013 174 lb 6.1 oz (79.1 kg)   22 1836 176 lb 12.9 oz (80.2 kg)     Vital Signs:   Temperature:  Temp: 97.1 °F (36.2 °C)  TMax:   Temp (24hrs), Av.9 °F (36.6 °C), Min:97.1 °F (36.2 °C), Max:98.8 °F (37.1 °C)    Respirations:  Resp: 17  Pulse:   Heart Rate: 87  BP:    BP: 136/80  BP Range: Systolic (72FAI), KBW:518 , Min:122 , Max:174 Diastolic (81XBZ), AYJ:89, Min:67, Max:87      Physical Examination:     General:  AAO x 3, speaking in full sentences, no accessory muscle use. HEENT: Atraumatic, normocephalic, no throat congestion, moist mucosa. Eyes:   Pupils equal, round and reactive to light, EOMI. Neck:   Supple  Chest:   Bilateral vesicular breath sounds, no rales or wheezes. Cardiac:  S1 S2 RR, no murmurs, gallops or rubs. Abdomen: Soft, non-tender, no masses or organomegaly, BS audible. :   No suprapubic or flank tenderness. Neuro:  AAO x 3, No FND. SKIN:  No rashes, good skin turgor. Extremities:  No edema. Labs:       Recent Labs     11/13/22  0920 11/14/22  0451 11/15/22  0624   WBC 9.7 6.4 6.3   RBC 3.06* 3.11* 2.75*   HGB 9.6* 10.0* 8.7*   HCT 29.6* 29.7* 26.3*   MCV 96.7 95.5 95.6   MCH 31.4 32.2 31.6   MCHC 32.4 33.7 33.1   RDW 14.4 13.9 13.3   * 437 391   MPV 9.2 9.1 9.0      BMP:   Recent Labs     11/13/22  0920 11/13/22  1244 11/13/22  1414 11/13/22  1530 11/13/22  1643 11/14/22  0451 11/15/22  0624   *  --   --   --   --  136 137   K 5.8*  --  5.1  --   --  4.0 4.3   CL 93*  --   --   --   --  92* 97*   CO2 16*  --   --   --   --  25 27   BUN 94*  --   --   --   --  44* 35*   CREATININE 14.73*  --   --   --   --  8.77* 7.08*   GLUCOSE 332*   < >  --    < > 185 163* 165*   CALCIUM 8.3*  --   --   --   --  8.8 7.5*    < > = values in this interval not displayed. Albumin:    Recent Labs     11/13/22  0920   LABALBU 4.0       SPEP:  Lab Results   Component Value Date/Time    PROT 7.6 11/13/2022 09:20 AM     Radiology:     Reviewed. Assessment:     ESRD on Hemodialysis. His regular HD days are Monday Wednesday Friday at [facility] Hemodialysis facility in [address] [place] [address] under Dr. Erica Barclay via is left upper arm fistula. Weakness. Nausea and vomiting. Missed dialysis.   Anemia of chronic disease  Secondary hyperparathyroidism  Hypertension  Diabetes type 2  Hyperkalemia -improved    Plan:   HD per schedule MWF. No acute need for dialysis today. Strict I's and O's with daily weight. Low potassium low phosphorus diet. Fluid restriction to 1200 mL/day.  working towards discharge. BMP in AM.  Will follow. Nutrition   Please ensure that patient is on a renal diet/TF. Avoid nephrotoxic drugs/contrast exposure. Attending Physician Statement  I have discussed the care of this patient, including pertinent history and exam findings, with the Resident/CNP. I have reviewed and edited the key elements of all parts of the encounter with the Resident/CNP. I agree with the assessment, plan and orders as documented by the Resident/CNP. Chris Sethi MD   Nephrology [address] [place] [address] Drive    This note is created with the assistance of a speech-recognition program. While intending to generate a document that actually reflects the content of the visit, no guarantees can be provided that every mistake has been identified and corrected by editing.
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
## 2022-11-15 NOTE — PLAN OF CARE
Tried calling patient's brother-in-law Emery Hagan at [phone] to discuss patient's current clinical condition as well as to request his past medical records. Patient's echocardiogram shows reduced ejection fraction with EF of 45 to 50% and cardiology recommended to obtain previous medical records due to history of CABG and CAD. Left voicemail to call back. Discussed with RN taking care of patient to obtain patient's previous medical records as well.     4:00 PM update: Spoke with patient's brother-in-law Hood regarding patient's previous medical records. He is concerned about patient's [place] [facility] [place] and living situation at his place considering his own family. I spoke to patient again and asked him about his previous cardiology and medical records. He informed me that he followed up at [place], [place] and he gave consent to obtain his previous medical records. I discussed with patient's RN [facility] to obtain medical records and have it faxed over. Will continue to follow up tomorrow on update.      Davy Corcoran MD, MPH, PGY-2  Internal Medicine Resident  [address], [place]

## 2022-11-15 NOTE — PROGRESS NOTES
Cardiology called RN and asked nurse to obtain information from patient's brother. RN asked patient's permission to give his brother a call and obtain information and records regarding patient's heart history and CABG x4. Patient refused permission for us to contact his brother regarding this information and states \"he doesn't know anything. \"
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
## 2022-11-16 LAB
ABSOLUTE EOS #: 0.75 K/UL (ref 0–0.44)
ABSOLUTE IMMATURE GRANULOCYTE: <0.03 K/UL (ref 0–0.3)
ABSOLUTE LYMPH #: 2.25 K/UL (ref 1.1–3.7)
ABSOLUTE MONO #: 0.54 K/UL (ref 0.1–1.2)
ACETAMINOPHEN LEVEL: <5 UG/ML (ref 10–30)
AMPHETAMINE: NEGATIVE NG/ML
ANION GAP SERPL CALCULATED.3IONS-SCNC: 16 MMOL/L (ref 9–17)
BARBITURATES: NEGATIVE NG/ML
BASOPHILS # BLD: 1 % (ref 0–2)
BASOPHILS ABSOLUTE: 0.05 K/UL (ref 0–0.2)
BENZODIAZEPINES: NEGATIVE NG/ML
BUN BLDV-MCNC: 54 MG/DL (ref 8–23)
BUPRENORPHINE: NEGATIVE NG/ML
CALCIUM SERPL-MCNC: 7.5 MG/DL (ref 8.6–10.4)
CHLORIDE BLD-SCNC: 95 MMOL/L (ref 98–107)
CO2: 25 MMOL/L (ref 20–31)
COCAINE: NEGATIVE NG/ML
CREAT SERPL-MCNC: 8.54 MG/DL (ref 0.7–1.2)
DRUGS OF ABUSE COMMENT: ABNORMAL
EOSINOPHILS RELATIVE PERCENT: 14 % (ref 1–4)
ETHANOL PERCENT: <0.01 %
ETHANOL: <10 MG/DL
GFR SERPL CREATININE-BSD FRML MDRD: 6 ML/MIN/1.73M2
GLUCOSE BLD-MCNC: 153 MG/DL (ref 70–99)
GLUCOSE BLD-MCNC: 153 MG/DL (ref 75–110)
GLUCOSE BLD-MCNC: 190 MG/DL (ref 75–110)
GLUCOSE BLD-MCNC: 241 MG/DL (ref 75–110)
HCT VFR BLD CALC: 31.7 % (ref 40.7–50.3)
HEMOGLOBIN: 10.5 G/DL (ref 13–17)
IMMATURE GRANULOCYTES: 0 %
LYMPHOCYTES # BLD: 44 % (ref 24–43)
MCH RBC QN AUTO: 31.5 PG (ref 25.2–33.5)
MCHC RBC AUTO-ENTMCNC: 33.1 G/DL (ref 28.4–34.8)
MCV RBC AUTO: 95.2 FL (ref 82.6–102.9)
METHADONE: NEGATIVE NG/ML
METHAMPHETAMINE: NEGATIVE NG/ML
MONOCYTES # BLD: 10 % (ref 3–12)
NRBC AUTOMATED: 0 PER 100 WBC
OPIATES: NEGATIVE NG/ML
OXYCODONE: NEGATIVE NG/ML
PDW BLD-RTO: 13.4 % (ref 11.8–14.4)
PHENCYCLIDINE: NEGATIVE NG/ML
PLATELET # BLD: 361 K/UL (ref 138–453)
PMV BLD AUTO: 9 FL (ref 8.1–13.5)
POTASSIUM SERPL-SCNC: 4.6 MMOL/L (ref 3.7–5.3)
RBC # BLD: 3.33 M/UL (ref 4.21–5.77)
SALICYLATE LEVEL: <1 MG/DL (ref 3–10)
SEG NEUTROPHILS: 31 % (ref 36–65)
SEGMENTED NEUTROPHILS ABSOLUTE COUNT: 1.65 K/UL (ref 1.5–8.1)
SODIUM BLD-SCNC: 136 MMOL/L (ref 135–144)
THC: NEGATIVE NG/ML
TOXIC TRICYCLIC SC,BLOOD: NEGATIVE
WBC # BLD: 5.3 K/UL (ref 3.5–11.3)

## 2022-11-16 PROCEDURE — 6370000000 HC RX 637 (ALT 250 FOR IP)

## 2022-11-16 PROCEDURE — 82947 ASSAY GLUCOSE BLOOD QUANT: CPT

## 2022-11-16 PROCEDURE — 6370000000 HC RX 637 (ALT 250 FOR IP): Performed by: INTERNAL MEDICINE

## 2022-11-16 PROCEDURE — 36415 COLL VENOUS BLD VENIPUNCTURE: CPT

## 2022-11-16 PROCEDURE — 6360000002 HC RX W HCPCS: Performed by: STUDENT IN AN ORGANIZED HEALTH CARE EDUCATION/TRAINING PROGRAM

## 2022-11-16 PROCEDURE — 99233 SBSQ HOSP IP/OBS HIGH 50: CPT | Performed by: INTERNAL MEDICINE

## 2022-11-16 PROCEDURE — 90935 HEMODIALYSIS ONE EVALUATION: CPT | Performed by: INTERNAL MEDICINE

## 2022-11-16 PROCEDURE — 1200000000 HC SEMI PRIVATE

## 2022-11-16 PROCEDURE — 2580000003 HC RX 258: Performed by: STUDENT IN AN ORGANIZED HEALTH CARE EDUCATION/TRAINING PROGRAM

## 2022-11-16 PROCEDURE — 80048 BASIC METABOLIC PNL TOTAL CA: CPT

## 2022-11-16 PROCEDURE — 90935 HEMODIALYSIS ONE EVALUATION: CPT

## 2022-11-16 PROCEDURE — 85025 COMPLETE CBC W/AUTO DIFF WBC: CPT

## 2022-11-16 RX ADMIN — HEPARIN SODIUM 5000 UNITS: 5000 INJECTION INTRAVENOUS; SUBCUTANEOUS at 15:09

## 2022-11-16 RX ADMIN — AMLODIPINE BESYLATE 10 MG: 10 TABLET ORAL at 15:09

## 2022-11-16 RX ADMIN — SODIUM CHLORIDE, PRESERVATIVE FREE 10 ML: 5 INJECTION INTRAVENOUS at 09:35

## 2022-11-16 RX ADMIN — CARVEDILOL 25 MG: 25 TABLET, FILM COATED ORAL at 15:09

## 2022-11-16 RX ADMIN — FERROUS SULFATE TAB EC 325 MG (65 MG FE EQUIVALENT) 325 MG: 325 (65 FE) TABLET DELAYED RESPONSE at 15:09

## 2022-11-16 RX ADMIN — SODIUM CHLORIDE, PRESERVATIVE FREE 10 ML: 5 INJECTION INTRAVENOUS at 21:36

## 2022-11-16 RX ADMIN — HEPARIN SODIUM 5000 UNITS: 5000 INJECTION INTRAVENOUS; SUBCUTANEOUS at 05:45

## 2022-11-16 RX ADMIN — ASPIRIN 81 MG: 81 TABLET, CHEWABLE ORAL at 15:09

## 2022-11-16 NOTE — PLAN OF CARE
Problem: ABCDS Injury Assessment  Goal: Absence of physical injury  Outcome: Progressing  Flowsheets (Taken 11/15/2022 1930)  Absence of Physical Injury: Implement safety measures based on patient assessment     Problem: Safety - Adult  Goal: Free from fall injury  Outcome: Progressing  Flowsheets (Taken 11/15/2022 1930)  Free From Fall Injury: Instruct family/caregiver on patient safety     Problem: Skin/Tissue Integrity  Goal: Absence of new skin breakdown  Description: 1. Monitor for areas of redness and/or skin breakdown  2. Assess vascular access sites hourly  3. Every 4-6 hours minimum:  Change oxygen saturation probe site  4. Every 4-6 hours:  If on nasal continuous positive airway pressure, respiratory therapy assess nares and determine need for appliance change or resting period.   Outcome: Progressing     Problem: Chronic Conditions and Co-morbidities  Goal: Patient's chronic conditions and co-morbidity symptoms are monitored and maintained or improved  Outcome: Progressing

## 2022-11-16 NOTE — PROGRESS NOTES
Renal Progress Note    Patient :  Shawn Brito; 73 y.o. MRN# [id number]  Location:  9852/5503-43  Attending:  Ann Muir MD  Admit Date:  2022   Hospital Day: 3      Subjective:       Patient is seen examined HD. Tolerating the procedure well. No new issues reported overnight. He normally gets dialysis as per MWF schedule. BMP results from today reviewed electrolytes stable with Potassium 4.6 and bicarb 25. Outpatient Medications:     Medications Prior to Admission: aspirin 81 MG chewable tablet, Take 81 mg by mouth as needed for Pain    Current Medications:     Scheduled Meds:    aspirin  81 mg Oral Daily    amLODIPine  10 mg Oral Daily    ferrous sulfate  325 mg Oral Daily with breakfast    carvedilol  25 mg Oral BID WC    sodium chloride flush  5-40 mL IntraVENous 2 times per day    heparin (porcine)  5,000 Units SubCUTAneous 3 times per day    insulin lispro  0-8 Units SubCUTAneous TID WC    insulin lispro  0-4 Units SubCUTAneous Nightly     Continuous Infusions:    dextrose      sodium chloride       PRN Meds:  hypromellose, glucose, dextrose bolus **OR** dextrose bolus, glucagon (rDNA), dextrose, sodium chloride flush, sodium chloride, ondansetron **OR** ondansetron, polyethylene glycol, acetaminophen **OR** acetaminophen, labetalol    Input/Output:       I/O last 3 completed shifts:  In: -   Out: 200 [Urine:200].     Patient Vitals for the past 96 hrs (Last 3 readings):   Weight   22 1005 174 lb 13.2 oz (79.3 kg)   22 1324 171 lb 1.2 oz (77.6 kg)   22 1013 174 lb 6.1 oz (79.1 kg)       Vital Signs:   Temperature:  Temp: 97.7 °F (36.5 °C)  TMax:   Temp (24hrs), Av.6 °F (36.4 °C), Min:97.4 °F (36.3 °C), Max:97.7 °F (36.5 °C)    Respirations:  Resp: 18  Pulse:   Heart Rate: 78  BP:    BP: 130/67  BP Range: Systolic (67PIS), PFJ:294 , Min:120 , :286       Diastolic (48DYB), LOP:60, Min:65, Max:95      Physical Examination:     General:  AAO x 3, speaking in full sentences, no accessory muscle use. HEENT: Atraumatic, normocephalic, no throat congestion, moist mucosa. Eyes:   Pupils equal, round and reactive to light, EOMI. Neck:   Supple  Chest:   Bilateral vesicular breath sounds, no rales or wheezes. Cardiac:  S1 S2 RR, no murmurs, gallops or rubs. Abdomen: Soft, non-tender, no masses or organomegaly, BS audible. :   No suprapubic or flank tenderness. Neuro:  AAO x 3, No FND. SKIN:  No rashes, good skin turgor. Extremities:  No edema. Labs:       Recent Labs     11/14/22  0451 11/15/22  0624 11/16/22  0602   WBC 6.4 6.3 5.3   RBC 3.11* 2.75* 3.33*   HGB 10.0* 8.7* 10.5*   HCT 29.7* 26.3* 31.7*   MCV 95.5 95.6 95.2   MCH 32.2 31.6 31.5   MCHC 33.7 33.1 33.1   RDW 13.9 13.3 13.4    391 361   MPV 9.1 9.0 9.0        BMP:   Recent Labs     11/14/22  0451 11/15/22  0624 11/16/22  0602    137 136   K 4.0 4.3 4.6   CL 92* 97* 95*   CO2 25 27 25   BUN 44* 35* 54*   CREATININE 8.77* 7.08* 8.54*   GLUCOSE 163* 165* 153*   CALCIUM 8.8 7.5* 7.5*       SPEP:  Lab Results   Component Value Date/Time    PROT 7.6 11/13/2022 09:20 AM     Radiology:     Reviewed. Assessment:     ESRD on Hemodialysis. His regular HD days are Monday Wednesday Friday at [facility] Hem[facility] facility in [address] [place] [address] under Dr. Cierra Calderon via left upper arm fistula. Weakness. Nausea and vomiting. Missed dialysis. Anemia of chronic disease  Secondary hyperparathyroidism  Hypertension  Diabetes type 2  Hyperkalemia -improved    Plan:   Patient was seen and examined on HD at bedside. Orders were confirmed with the HD nurse. Low potassium low phosphorus diet. Fluid restriction to 1500 mL/day.  working towards discharge. Will follow. Nutrition   Please ensure that patient is on a renal diet/TF. Avoid nephrotoxic drugs/contrast exposure. Chris Aguero MD  [facility]     This note is created with the assistance of a speech-recognition program. While intending to generate a document that actually reflects the content of the visit, no guarantees can be provided that every mistake has been identified and corrected by editing.

## 2022-11-16 NOTE — PROGRESS NOTES
Dialysis Time Out  To be done by RN and tech or 2 RNs  Staff Names Masood RN    [x]  Identity of the patient using 2 patient identifiers  [x]  Consent for treatment  [x]  Equipment-proper machine and dialyzer  [x]  B-Hep B status  [x]  Orders- to include bath, blood flow, dialyzer, time and fluid removal  [x]  Access-Correct site and in working order  [x]  Time for patient to ask questions.
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
## 2022-11-16 NOTE — CARE COORDINATION
Received phone  message this a.m. from [facility] requesting a statement of evaluation to be placed on the chart  Will await Psych eval and recommendations.

## 2022-11-16 NOTE — PROGRESS NOTES
[facility]  Progress Note                   Date:   11/16/2022  Patient name: Elfego Zafar  Date of admission:  11/13/2022  8:56 AM  MRN:   [id number]  YOB: 1955  PCP: No primary care provider on file. Reason for Admission: End stage renal disease ([facility] [N18.6]  Hyperkalemia [E87.5]  Admission for dialysis ([facility] [Z99.2]  Hypervolemia, unspecified hypervolemia type [E87.70]    Subjective:       Clinical Changes /Abnormalities: In HD. No acute CV issues/concerns overnight. Denies any CP or SOB. BP elevated today. Review of Systems    Medications:   Scheduled Meds:   aspirin  81 mg Oral Daily    amLODIPine  10 mg Oral Daily    ferrous sulfate  325 mg Oral Daily with breakfast    carvedilol  25 mg Oral BID WC    sodium chloride flush  5-40 mL IntraVENous 2 times per day    heparin (porcine)  5,000 Units SubCUTAneous 3 times per day    insulin lispro  0-8 Units SubCUTAneous TID WC    insulin lispro  0-4 Units SubCUTAneous Nightly     Continuous Infusions:   dextrose      sodium chloride       CBC:   Recent Labs     11/14/22  0451 11/15/22  0624 11/16/22  0602   WBC 6.4 6.3 5.3   HGB 10.0* 8.7* 10.5*    391 361     BMP:    Recent Labs     11/14/22  0451 11/15/22  0624 11/16/22  0602    137 136   K 4.0 4.3 4.6   CL 92* 97* 95*   CO2 25 27 25   BUN 44* 35* 54*   CREATININE 8.77* 7.08* 8.54*   GLUCOSE 163* 165* 153*     Hepatic:No results for input(s): AST, ALT, ALB, BILITOT, ALKPHOS in the last 72 hours. Troponin: No results for input(s): TROPHS in the last 72 hours. BNP: No results for input(s): BNP in the last 72 hours. Lipids: No results for input(s): CHOL, HDL in the last 72 hours.     Invalid input(s): LDLCALCU  INR:   Recent Labs     11/14/22 0451   INR 1.0       Objective:   Vitals: BP (!) 171/83   Pulse 82   Temp 97.7 °F (36.5 °C)   Resp 18   Ht 5' 9.5\" (1.765 m)   Wt 174 lb 13.2 oz (79.3 kg)   SpO2 98%   BMI 25.45 kg/m²   General appearance: alert and cooperative with exam  HEENT: Head: Normocephalic, no lesions, without obvious abnormality. Neck:no JVD, trachea midline, no adenopathy  Lungs: Dim  to auscultation  Heart: Regular rate and rhythm, s1/s2 auscultated, no murmurs  Abdomen: soft, non-tender, bowel sounds active  Extremities: no edema  Neurologic: not done    Echo 11/14/22  Summary   Normal LV size. No obvious wall motion abnormality noted   Mildly reduced LV systolic function. EF 45-50%   Normal RV size and function   Mildly dilated LA   No obvious significant structural valvular abnormality noted   Normal aortic root dimensions   No significant pericardial effusion seem   IVC appears normal size difficult to assess respiratory variation     Assessment / Acute Cardiac Problems:   Hx of CABG  Essential HTN  Uremic Encephalopathy   ESRD - HD dependent MWF  COPD  T2DM on Insulin    Patient Active Problem List:     Hyperkalemia     Admission for dialysis ([facility]     Hypervolemia     ESRD on dialysis ([facility]     Acute metabolic encephalopathy     Uremic encephalopathy     Uremia     Primary hypertension     Insulin dependent type 2 diabetes mellitus ([facility]     Diabetic neuropathy (HCC)     Iron deficiency anemia     Elevated brain natriuretic peptide (BNP) level     Nausea and vomiting     Anemia of chronic disease      Plan of Treatment:   Stable. Patient denies any CP or cardiac symptoms/concerns. Had missed his HD treatment. Echo as above with mildly reduced LVEF wihout significant WMA or structual valvular disease. No plans for further work-up at this time. Recommend following up as OP as has not followed with a cardiologist  BP elevated - did not get any of his AM medications. Recommend to give Norvasc and Coreg when back to room and monitor BP. Consider Hydralazine if SBP remains >160.   If stable will be OK for discharge from CV standpoint when arrangements made    Electronically signed by BERNABE Weiss CNP on 11/16/2022 at [address] [facility] [address] [place] [address].  [phone]

## 2022-11-16 NOTE — PROGRESS NOTES
Dialysis Post Treatment Note  Vitals:    11/16/22 1355   BP: (!) 155/84   Pulse: 80   Resp: 18   Temp: 98.2 °F (36.8 °C)   SpO2:      Pre-Weight = 79.3kg  Post-weight = Weight: 171 lb 11.8 oz (77.9 kg)  Total Liters Processed = Blood Volume Processed (Liters): 78.9 l/min  Rinseback Volume (mL) = Rinseback Volume (ml): 300 ml  Net Removal (mL) =  1200mL  Patient's dry weight=  Type of access used=AVF left  Length of treatment=210mins    Pt. Tolerated HD tx. Able to remove 1.2L of fluid. No adverse events pre, intra, and post HD treatment. Returned to room per stretcher via transport with no complaints.

## 2022-11-16 NOTE — CARE COORDINATION
SW following for HD needs. Spoke with Erick at [facility] in [place]. Erick confirmed patient still has MWF 4:45pm chair at clinic and is able to return. SW will await psych eval determination and plan.

## 2022-11-16 NOTE — PROGRESS NOTES
Writer reached put to [facility] charge RN who states psych consult will be scheduled for tomorrow.

## 2022-11-17 LAB
ABSOLUTE EOS #: 0.74 K/UL (ref 0–0.44)
ABSOLUTE IMMATURE GRANULOCYTE: <0.03 K/UL (ref 0–0.3)
ABSOLUTE LYMPH #: 2.21 K/UL (ref 1.1–3.7)
ABSOLUTE MONO #: 0.59 K/UL (ref 0.1–1.2)
ANION GAP SERPL CALCULATED.3IONS-SCNC: 15 MMOL/L (ref 9–17)
BASOPHILS # BLD: 1 % (ref 0–2)
BASOPHILS ABSOLUTE: 0.07 K/UL (ref 0–0.2)
BUN BLDV-MCNC: 39 MG/DL (ref 8–23)
CALCIUM SERPL-MCNC: 7.7 MG/DL (ref 8.6–10.4)
CHLORIDE BLD-SCNC: 96 MMOL/L (ref 98–107)
CO2: 25 MMOL/L (ref 20–31)
CREAT SERPL-MCNC: 6.22 MG/DL (ref 0.7–1.2)
EOSINOPHILS RELATIVE PERCENT: 14 % (ref 1–4)
GFR SERPL CREATININE-BSD FRML MDRD: 9 ML/MIN/1.73M2
GLUCOSE BLD-MCNC: 130 MG/DL (ref 75–110)
GLUCOSE BLD-MCNC: 154 MG/DL (ref 70–99)
GLUCOSE BLD-MCNC: 181 MG/DL (ref 75–110)
GLUCOSE BLD-MCNC: 199 MG/DL (ref 75–110)
GLUCOSE BLD-MCNC: 206 MG/DL (ref 75–110)
HCT VFR BLD CALC: 26.7 % (ref 40.7–50.3)
HEMOGLOBIN: 8.9 G/DL (ref 13–17)
IMMATURE GRANULOCYTES: 0 %
LYMPHOCYTES # BLD: 40 % (ref 24–43)
MCH RBC QN AUTO: 33.1 PG (ref 25.2–33.5)
MCHC RBC AUTO-ENTMCNC: 33.3 G/DL (ref 28.4–34.8)
MCV RBC AUTO: 99.3 FL (ref 82.6–102.9)
MONOCYTES # BLD: 11 % (ref 3–12)
NRBC AUTOMATED: 0 PER 100 WBC
PDW BLD-RTO: 13.5 % (ref 11.8–14.4)
PLATELET # BLD: 540 K/UL (ref 138–453)
PMV BLD AUTO: 9.9 FL (ref 8.1–13.5)
POTASSIUM SERPL-SCNC: 4.4 MMOL/L (ref 3.7–5.3)
RBC # BLD: 2.69 M/UL (ref 4.21–5.77)
SEG NEUTROPHILS: 34 % (ref 36–65)
SEGMENTED NEUTROPHILS ABSOLUTE COUNT: 1.82 K/UL (ref 1.5–8.1)
SODIUM BLD-SCNC: 136 MMOL/L (ref 135–144)
WBC # BLD: 5.4 K/UL (ref 3.5–11.3)

## 2022-11-17 PROCEDURE — 2580000003 HC RX 258: Performed by: STUDENT IN AN ORGANIZED HEALTH CARE EDUCATION/TRAINING PROGRAM

## 2022-11-17 PROCEDURE — 6370000000 HC RX 637 (ALT 250 FOR IP): Performed by: INTERNAL MEDICINE

## 2022-11-17 PROCEDURE — 82947 ASSAY GLUCOSE BLOOD QUANT: CPT

## 2022-11-17 PROCEDURE — 1200000000 HC SEMI PRIVATE

## 2022-11-17 PROCEDURE — 6370000000 HC RX 637 (ALT 250 FOR IP)

## 2022-11-17 PROCEDURE — 94761 N-INVAS EAR/PLS OXIMETRY MLT: CPT

## 2022-11-17 PROCEDURE — 80048 BASIC METABOLIC PNL TOTAL CA: CPT

## 2022-11-17 PROCEDURE — 85025 COMPLETE CBC W/AUTO DIFF WBC: CPT

## 2022-11-17 PROCEDURE — 6370000000 HC RX 637 (ALT 250 FOR IP): Performed by: PSYCHIATRY & NEUROLOGY

## 2022-11-17 PROCEDURE — 99233 SBSQ HOSP IP/OBS HIGH 50: CPT | Performed by: INTERNAL MEDICINE

## 2022-11-17 PROCEDURE — 99232 SBSQ HOSP IP/OBS MODERATE 35: CPT | Performed by: INTERNAL MEDICINE

## 2022-11-17 PROCEDURE — 36415 COLL VENOUS BLD VENIPUNCTURE: CPT

## 2022-11-17 RX ORDER — FLUTICASONE PROPIONATE 50 MCG
1 SPRAY, SUSPENSION (ML) NASAL DAILY
Status: DISCONTINUED | OUTPATIENT
Start: 2022-11-17 | End: 2022-11-22 | Stop reason: HOSPADM

## 2022-11-17 RX ADMIN — CARVEDILOL 25 MG: 25 TABLET, FILM COATED ORAL at 09:35

## 2022-11-17 RX ADMIN — FERROUS SULFATE TAB EC 325 MG (65 MG FE EQUIVALENT) 325 MG: 325 (65 FE) TABLET DELAYED RESPONSE at 09:35

## 2022-11-17 RX ADMIN — LURASIDONE HYDROCHLORIDE 40 MG: 40 TABLET, FILM COATED ORAL at 14:35

## 2022-11-17 RX ADMIN — SODIUM CHLORIDE, PRESERVATIVE FREE 10 ML: 5 INJECTION INTRAVENOUS at 21:38

## 2022-11-17 RX ADMIN — ASPIRIN 81 MG: 81 TABLET, CHEWABLE ORAL at 09:35

## 2022-11-17 RX ADMIN — SODIUM CHLORIDE, PRESERVATIVE FREE 10 ML: 5 INJECTION INTRAVENOUS at 09:35

## 2022-11-17 RX ADMIN — CARVEDILOL 25 MG: 25 TABLET, FILM COATED ORAL at 17:56

## 2022-11-17 RX ADMIN — AMLODIPINE BESYLATE 10 MG: 10 TABLET ORAL at 09:35
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
## 2022-11-17 NOTE — CARE COORDINATION
Patient is to have psych consult today. Awaiting results to proceed with transition plan. Earl Krishnamurthy from Dialysis confirmed that patient has an active chair time in [place] and is able to return, pending psych evaluation.

## 2022-11-17 NOTE — PROGRESS NOTES
Renal Progress Note    Patient :  Fernanda Lott; 73 y.o. MRN# [id number]  Location:  8901/9962-19  Attending:  Sheila Rose MD  Admit Date:  2022   Hospital Day: 4      Subjective:       Patient was seen and examined. No new issues reported overnight. Did have a good dialysis yesterday ran for 210 minutes and got about 1.2 L removed. He normally gets dialysis as per MWF schedule.  did confirm an outpatient dialysis spot at [facility] in [place] on MWF schedule. Patient was evaluated by psych and no current indication for admission to psychiatry. Outpatient Medications:     Medications Prior to Admission: aspirin 81 MG chewable tablet, Take 81 mg by mouth as needed for Pain    Current Medications:     Scheduled Meds:    fluticasone  1 spray Each Nostril Daily    lurasidone  40 mg Oral Daily    aspirin  81 mg Oral Daily    amLODIPine  10 mg Oral Daily    ferrous sulfate  325 mg Oral Daily with breakfast    carvedilol  25 mg Oral BID WC    sodium chloride flush  5-40 mL IntraVENous 2 times per day    heparin (porcine)  5,000 Units SubCUTAneous 3 times per day    insulin lispro  0-8 Units SubCUTAneous TID WC    insulin lispro  0-4 Units SubCUTAneous Nightly     Continuous Infusions:    dextrose      sodium chloride       PRN Meds:  hypromellose, glucose, dextrose bolus **OR** dextrose bolus, glucagon (rDNA), dextrose, sodium chloride flush, sodium chloride, ondansetron **OR** ondansetron, polyethylene glycol, acetaminophen **OR** acetaminophen, labetalol    Input/Output:       I/O last 3 completed shifts: In: 300   Out: 1875 [Urine:375].     Patient Vitals for the past 96 hrs (Last 3 readings):   Weight   22 1355 171 lb 11.8 oz (77.9 kg)   22 1005 174 lb 13.2 oz (79.3 kg)   22 1324 171 lb 1.2 oz (77.6 kg)     Vital Signs:   Temperature:  Temp: 98.4 °F (36.9 °C)  TMax:   Temp (24hrs), Av.3 °F (36.8 °C), Min:98.2 °F (36.8 °C), Max:98.4 °F (36.9 °C)    Respirations: Resp: 18  Pulse:   Heart Rate: 89  BP:    BP: (!) 126/58  BP Range: Systolic (31WEV), CGW:053 , Min:126 , QHU:659       Diastolic (89EPP), FOF:24, Min:58, Max:84      Physical Examination:     General:  AAO x 3, speaking in full sentences, no accessory muscle use. HEENT: Atraumatic, normocephalic, no throat congestion, moist mucosa. Eyes:   Pupils equal, round and reactive to light, EOMI. Neck:   Supple  Chest:   Bilateral vesicular breath sounds, no rales or wheezes. Cardiac:  S1 S2 RR, no murmurs, gallops or rubs. Abdomen: Soft, non-tender, no masses or organomegaly, BS audible. :   No suprapubic or flank tenderness. Neuro:  AAO x 3, No FND. SKIN:  No rashes, good skin turgor. Extremities:  No edema. Labs:       Recent Labs     11/15/22  0624 11/16/22  0602 11/17/22  0628   WBC 6.3 5.3 5.4   RBC 2.75* 3.33* 2.69*   HGB 8.7* 10.5* 8.9*   HCT 26.3* 31.7* 26.7*   MCV 95.6 95.2 99.3   MCH 31.6 31.5 33.1   MCHC 33.1 33.1 33.3   RDW 13.3 13.4 13.5    361 540*   MPV 9.0 9.0 9.9      BMP:   Recent Labs     11/15/22  0624 11/16/22  0602 11/17/22  0628    136 136   K 4.3 4.6 4.4   CL 97* 95* 96*   CO2 27 25 25   BUN 35* 54* 39*   CREATININE 7.08* 8.54* 6.22*   GLUCOSE 165* 153* 154*   CALCIUM 7.5* 7.5* 7.7*     SPEP:  Lab Results   Component Value Date/Time    PROT 7.6 11/13/2022 09:20 AM     Radiology:     Reviewed. Assessment:     ESRD on Hemodialysis. His regular HD days are Monday Wednesday Friday at [facility] Hem[facility]ialysis facility in [address] [place] [address] under Dr. Vince Teressa via left upper arm fistula. Weakness. Nausea and vomiting. Missed dialysis. Anemia of chronic disease  Secondary hyperparathyroidism  Hypertension  Diabetes type 2  Hyperkalemia -improved    Plan:   No acute need for dialysis today. Will get regular dialysis in a.m. as per [facility] schedule. Low potassium low phosphorus diet. Fluid restriction to 1500 mL/day.    did confirm an outpatient dialysis spot at [facility] in [place] on MWF schedule. Okay to discharge from nephrology standpoint. Nutrition   Please ensure that patient is on a renal diet/TF. Avoid nephrotoxic drugs/contrast exposure. Chris Hernandez MD  [facility]     This note is created with the assistance of a speech-recognition program. While intending to generate a document that actually reflects the content of the visit, no guarantees can be provided that every mistake has been identified and corrected by editing.

## 2022-11-17 NOTE — PLAN OF CARE
Problem: ABCDS Injury Assessment  Goal: Absence of physical injury  11/17/2022 0502 by Jemma Fierro RN  Outcome: Progressing  11/17/2022 0502 by Jemma Fierro RN  Outcome: Progressing  Flowsheets (Taken 11/16/2022 2000)  Absence of Physical Injury: Implement safety measures based on patient assessment     Problem: Safety - Adult  Goal: Free from fall injury  11/17/2022 0502 by Jemma Fierro RN  Outcome: Progressing  11/17/2022 0502 by Jemma Fierro RN  Outcome: Progressing     Problem: Skin/Tissue Integrity  Goal: Absence of new skin breakdown  Description: 1. Monitor for areas of redness and/or skin breakdown  2. Assess vascular access sites hourly  3. Every 4-6 hours minimum:  Change oxygen saturation probe site  4. Every 4-6 hours:  If on nasal continuous positive airway pressure, respiratory therapy assess nares and determine need for appliance change or resting period.   11/17/2022 0502 by Jemma Fierro RN  Outcome: Progressing  11/17/2022 0502 by Jemma Fierro RN  Outcome: Progressing     Problem: Chronic Conditions and Co-morbidities  Goal: Patient's chronic conditions and co-morbidity symptoms are monitored and maintained or improved  11/17/2022 0502 by Jemma Fierro RN  Outcome: Progressing  11/17/2022 0502 by Jemma Fierro RN  Outcome: Progressing

## 2022-11-17 NOTE — CARE COORDINATION
Called Patient's ex brother in law in [address] [facility].  [phone]. Informed him that Kimberlee Arizmendi was getting ready for discharge and asked if the offer to go back to his home is still a plan. Hood tells me that at this time it is not a good plan as he lives with many children in his home and he just does not have the room nor will he be able to help him long term. He apologizes and tells me he will do what he can to help pout but patient returning to [address] Is not a good plan as there is no one there that is able to help. Hood tells me his ex wife lives there with their daughter but she is not willing to care for him either. Met with Arlet AVILEZ and patient at bedside. (See RAGHAV note). [address] back to inquire about his ASHKAN # for the car. He states he could not find it by that he provided me with the car insurance company. [place] [phone]. Left voicemail.   [place] [address] to inquire about car, I was informed that they are not able to run a ASHKAN number or a plate to locate car
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
## 2022-11-17 NOTE — PROGRESS NOTES
[facility]  Progress Note                   Date:   11/17/2022  Patient name: Elfego Zafar  Date of admission:  11/13/2022  8:56 AM  MRN:   [id number]  YOB: 1955  PCP: No primary care provider on file. Reason for Admission: End stage renal disease ([facility] [N18.6]  Hyperkalemia [E87.5]  Admission for dialysis ([facility] [Z99.2]  Hypervolemia, unspecified hypervolemia type [E87.70]    Subjective:       Clinical Changes /Abnormalities: Iseen & examined in room. No acute CV issues/concerns overnight. Denies any CP or SOB. BP with improvement. Pending psych eval    Review of Systems    Medications:   Scheduled Meds:   fluticasone  1 spray Each Nostril Daily    aspirin  81 mg Oral Daily    amLODIPine  10 mg Oral Daily    ferrous sulfate  325 mg Oral Daily with breakfast    carvedilol  25 mg Oral BID WC    sodium chloride flush  5-40 mL IntraVENous 2 times per day    heparin (porcine)  5,000 Units SubCUTAneous 3 times per day    insulin lispro  0-8 Units SubCUTAneous TID WC    insulin lispro  0-4 Units SubCUTAneous Nightly     Continuous Infusions:   dextrose      sodium chloride       CBC:   Recent Labs     11/15/22  0624 11/16/22  0602 11/17/22  0628   WBC 6.3 5.3 5.4   HGB 8.7* 10.5* 8.9*    361 540*       BMP:    Recent Labs     11/15/22  0624 11/16/22  0602 11/17/22  0628    136 136   K 4.3 4.6 4.4   CL 97* 95* 96*   CO2 27 25 25   BUN 35* 54* 39*   CREATININE 7.08* 8.54* 6.22*   GLUCOSE 165* 153* 154*       Hepatic:No results for input(s): AST, ALT, ALB, BILITOT, ALKPHOS in the last 72 hours. Troponin: No results for input(s): TROPHS in the last 72 hours. BNP: No results for input(s): BNP in the last 72 hours. Lipids: No results for input(s): CHOL, HDL in the last 72 hours. Invalid input(s): LDLCALCU  INR:   No results for input(s): INR in the last 72 hours.       Objective:   Vitals: BP (!) 150/71   Pulse 89   Temp 98.4 °F (36.9 °C)   Resp 18   Ht 5' 9.5\" (1.765 m) Wt 171 lb 11.8 oz (77.9 kg)   SpO2 98%   BMI 25.00 kg/m²   General appearance: alert and cooperative with exam  HEENT: Head: Normocephalic, no lesions, without obvious abnormality. Neck:no JVD, trachea midline, no adenopathy  Lungs: Dim  to auscultation  Heart: Regular rate and rhythm, s1/s2 auscultated, no murmurs  Abdomen: soft, non-tender, bowel sounds active  Extremities: no edema  Neurologic: not done    Echo 11/14/22  Summary   Normal LV size. No obvious wall motion abnormality noted   Mildly reduced LV systolic function. EF 45-50%   Normal RV size and function   Mildly dilated LA   No obvious significant structural valvular abnormality noted   Normal aortic root dimensions   No significant pericardial effusion seem   IVC appears normal size difficult to assess respiratory variation     Assessment / Acute Cardiac Problems:   Hx of CABG  Essential HTN  Uremic Encephalopathy   ESRD - HD dependent MWF  COPD  T2DM on Insulin    Patient Active Problem List:     Hyperkalemia     Admission for dialysis ([address]     Hypervolemia     ESRD on dialysis ([address]     Acute metabolic encephalopathy     Uremic encephalopathy     Uremia     Primary hypertension     Insulin dependent type 2 diabetes mellitus ([address]     Diabetic neuropathy (HCC)     Iron deficiency anemia     Elevated brain natriuretic peptide (BNP) level     Nausea and vomiting     Anemia of chronic disease      Plan of Treatment:   Stable. Patient denies any CP or cardiac symptoms/concerns. Had missed his HD treatment. Reiterated importance of compliance  Echo as above with mildly reduced LVEF wihout significant WMA or structual valvular disease. No plans for further work-up at this time. Recommend following up as OP as has not followed with a cardiologist  BP improved.  Continue present meds and again encouraged compliance & follow-up  OK for discharge from CV standpoint when arrangements made    Electronically signed by BERNABE Weiss CNP on 11/17/2022 at [address] [place] [address].  [phone]

## 2022-11-17 NOTE — PLAN OF CARE
Problem: ABCDS Injury Assessment  Goal: Absence of physical injury  11/17/2022 1830 by Marina Guzman RN  Outcome: Progressing  11/17/2022 0502 by Bryan Juan RN  Outcome: Progressing  11/17/2022 0502 by Bryan Juan RN  Outcome: Progressing  Flowsheets (Taken 11/16/2022 2000)  Absence of Physical Injury: Implement safety measures based on patient assessment     Problem: Safety - Adult  Goal: Free from fall injury  11/17/2022 1830 by Marian Guzman RN  Outcome: Progressing  11/17/2022 0502 by Bryan Juan RN  Outcome: Progressing  11/17/2022 0502 by Bryan Juan RN  Outcome: Progressing     Problem: Skin/Tissue Integrity  Goal: Absence of new skin breakdown  Description: 1. Monitor for areas of redness and/or skin breakdown  2. Assess vascular access sites hourly  3. Every 4-6 hours minimum:  Change oxygen saturation probe site  4. Every 4-6 hours:  If on nasal continuous positive airway pressure, respiratory therapy assess nares and determine need for appliance change or resting period.   11/17/2022 1830 by Marian Guzman RN  Outcome: Progressing  11/17/2022 0502 by Bryan Juan RN  Outcome: Progressing  11/17/2022 0502 by Bryan Juan RN  Outcome: Progressing     Problem: Chronic Conditions and Co-morbidities  Goal: Patient's chronic conditions and co-morbidity symptoms are monitored and maintained or improved  11/17/2022 1830 by Marian Guzman RN  Outcome: Progressing  11/17/2022 0502 by Bryan Juan RN  Outcome: Progressing  11/17/2022 0502 by Bryan Juan RN  Outcome: Progressing

## 2022-11-18 LAB
ABSOLUTE EOS #: 0.58 K/UL (ref 0–0.44)
ABSOLUTE IMMATURE GRANULOCYTE: <0.03 K/UL (ref 0–0.3)
ABSOLUTE LYMPH #: 2.44 K/UL (ref 1.1–3.7)
ABSOLUTE MONO #: 0.53 K/UL (ref 0.1–1.2)
ANION GAP SERPL CALCULATED.3IONS-SCNC: 20 MMOL/L (ref 9–17)
BASOPHILS # BLD: 1 % (ref 0–2)
BASOPHILS ABSOLUTE: 0.06 K/UL (ref 0–0.2)
BUN BLDV-MCNC: 59 MG/DL (ref 8–23)
CALCIUM SERPL-MCNC: 7.7 MG/DL (ref 8.6–10.4)
CHLORIDE BLD-SCNC: 91 MMOL/L (ref 98–107)
CO2: 22 MMOL/L (ref 20–31)
CREAT SERPL-MCNC: 8.42 MG/DL (ref 0.7–1.2)
EOSINOPHILS RELATIVE PERCENT: 10 % (ref 1–4)
GFR SERPL CREATININE-BSD FRML MDRD: 6 ML/MIN/1.73M2
GLUCOSE BLD-MCNC: 154 MG/DL (ref 75–110)
GLUCOSE BLD-MCNC: 191 MG/DL (ref 75–110)
GLUCOSE BLD-MCNC: 206 MG/DL (ref 70–99)
GLUCOSE BLD-MCNC: 226 MG/DL (ref 75–110)
HCT VFR BLD CALC: 28.6 % (ref 40.7–50.3)
HEMOGLOBIN: 9.1 G/DL (ref 13–17)
IMMATURE GRANULOCYTES: 0 %
LYMPHOCYTES # BLD: 43 % (ref 24–43)
MCH RBC QN AUTO: 31.7 PG (ref 25.2–33.5)
MCHC RBC AUTO-ENTMCNC: 31.8 G/DL (ref 28.4–34.8)
MCV RBC AUTO: 99.7 FL (ref 82.6–102.9)
MONOCYTES # BLD: 9 % (ref 3–12)
NRBC AUTOMATED: 0 PER 100 WBC
PDW BLD-RTO: 13.4 % (ref 11.8–14.4)
PLATELET # BLD: 411 K/UL (ref 138–453)
PMV BLD AUTO: 9.1 FL (ref 8.1–13.5)
POTASSIUM SERPL-SCNC: 5.8 MMOL/L (ref 3.7–5.3)
RBC # BLD: 2.87 M/UL (ref 4.21–5.77)
SEG NEUTROPHILS: 37 % (ref 36–65)
SEGMENTED NEUTROPHILS ABSOLUTE COUNT: 2.15 K/UL (ref 1.5–8.1)
SODIUM BLD-SCNC: 133 MMOL/L (ref 135–144)
WBC # BLD: 5.8 K/UL (ref 3.5–11.3)

## 2022-11-18 PROCEDURE — 90935 HEMODIALYSIS ONE EVALUATION: CPT | Performed by: INTERNAL MEDICINE

## 2022-11-18 PROCEDURE — 6370000000 HC RX 637 (ALT 250 FOR IP): Performed by: PSYCHIATRY & NEUROLOGY

## 2022-11-18 PROCEDURE — 99232 SBSQ HOSP IP/OBS MODERATE 35: CPT | Performed by: INTERNAL MEDICINE

## 2022-11-18 PROCEDURE — 6360000002 HC RX W HCPCS: Performed by: STUDENT IN AN ORGANIZED HEALTH CARE EDUCATION/TRAINING PROGRAM

## 2022-11-18 PROCEDURE — 6370000000 HC RX 637 (ALT 250 FOR IP): Performed by: INTERNAL MEDICINE

## 2022-11-18 PROCEDURE — 85025 COMPLETE CBC W/AUTO DIFF WBC: CPT

## 2022-11-18 PROCEDURE — 6370000000 HC RX 637 (ALT 250 FOR IP)

## 2022-11-18 PROCEDURE — 1200000000 HC SEMI PRIVATE

## 2022-11-18 PROCEDURE — 80048 BASIC METABOLIC PNL TOTAL CA: CPT

## 2022-11-18 PROCEDURE — 36415 COLL VENOUS BLD VENIPUNCTURE: CPT

## 2022-11-18 PROCEDURE — 90935 HEMODIALYSIS ONE EVALUATION: CPT

## 2022-11-18 PROCEDURE — 82947 ASSAY GLUCOSE BLOOD QUANT: CPT

## 2022-11-18 PROCEDURE — 2580000003 HC RX 258: Performed by: STUDENT IN AN ORGANIZED HEALTH CARE EDUCATION/TRAINING PROGRAM

## 2022-11-18 RX ORDER — GLIPIZIDE 5 MG/1
5 TABLET ORAL DAILY
Qty: 60 TABLET | Refills: 3 | Status: SHIPPED | OUTPATIENT
Start: 2022-11-18 | End: 2022-11-22 | Stop reason: SDUPTHER

## 2022-11-18 RX ORDER — CARVEDILOL 25 MG/1
25 TABLET ORAL 2 TIMES DAILY WITH MEALS
Qty: 60 TABLET | Refills: 3 | Status: SHIPPED | OUTPATIENT
Start: 2022-11-18 | End: 2022-11-22 | Stop reason: SDUPTHER

## 2022-11-18 RX ORDER — LANOLIN ALCOHOL/MO/W.PET/CERES
325 CREAM (GRAM) TOPICAL
Qty: 90 TABLET | Refills: 3 | Status: SHIPPED | OUTPATIENT
Start: 2022-11-18 | End: 2022-11-22 | Stop reason: SDUPTHER

## 2022-11-18 RX ORDER — AMLODIPINE BESYLATE 10 MG/1
10 TABLET ORAL DAILY
Qty: 30 TABLET | Refills: 3 | Status: SHIPPED | OUTPATIENT
Start: 2022-11-18 | End: 2022-11-22 | Stop reason: SDUPTHER

## 2022-11-18 RX ADMIN — LURASIDONE HYDROCHLORIDE 40 MG: 40 TABLET, FILM COATED ORAL at 15:20

## 2022-11-18 RX ADMIN — FLUTICASONE PROPIONATE 1 SPRAY: 50 SPRAY, METERED NASAL at 02:13

## 2022-11-18 RX ADMIN — SODIUM CHLORIDE, PRESERVATIVE FREE 10 ML: 5 INJECTION INTRAVENOUS at 20:50

## 2022-11-18 RX ADMIN — HEPARIN SODIUM 5000 UNITS: 5000 INJECTION INTRAVENOUS; SUBCUTANEOUS at 21:54

## 2022-11-18 RX ADMIN — SODIUM CHLORIDE, PRESERVATIVE FREE 10 ML: 5 INJECTION INTRAVENOUS at 15:23

## 2022-11-18 RX ADMIN — CARVEDILOL 25 MG: 25 TABLET, FILM COATED ORAL at 18:08

## 2022-11-18 RX ADMIN — AMLODIPINE BESYLATE 10 MG: 10 TABLET ORAL at 15:20

## 2022-11-18 RX ADMIN — HEPARIN SODIUM 5000 UNITS: 5000 INJECTION INTRAVENOUS; SUBCUTANEOUS at 06:18

## 2022-11-18 RX ADMIN — ASPIRIN 81 MG: 81 TABLET, CHEWABLE ORAL at 15:20

## 2022-11-18 RX ADMIN — FERROUS SULFATE TAB EC 325 MG (65 MG FE EQUIVALENT) 325 MG: 325 (65 FE) TABLET DELAYED RESPONSE at 15:20
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
## 2022-11-18 NOTE — PROGRESS NOTES
Dialysis Post Treatment Note  Vitals:    11/18/22 1500   BP: (!) 163/67   Pulse: 89   Resp: 18   Temp: 97.1 °F (36.2 °C)   SpO2: 99%     Pre-Weight = 81 kg  Post-weight = Weight: 175 lb 4.3 oz (79.5 kg)  Total Liters Processed = Blood Volume Processed (Liters): 85.8 l/min  Rinseback Volume (mL) = Rinseback Volume (ml): 300 ml  Net Removal (mL) =  1500  Patient's dry weight= 79.5 kg  Type of access used= AVF  Length of treatment= 210 min    Pt tolerated tx well w/o complaint. 1.5L removed.

## 2022-11-18 NOTE — PLAN OF CARE
Problem: ABCDS Injury Assessment  Goal: Absence of physical injury  11/18/2022 1854 by Emma Anderson RN  Outcome: Progressing  11/18/2022 0537 by Yomaira Baez RN  Outcome: Progressing  Flowsheets (Taken 11/17/2022 2000)  Absence of Physical Injury: Implement safety measures based on patient assessment     Problem: Safety - Adult  Goal: Free from fall injury  11/18/2022 1854 by Emma Anderson RN  Outcome: Progressing  11/18/2022 0537 by Yomaira Baez RN  Outcome: Progressing  Flowsheets (Taken 11/17/2022 2000)  Free From Fall Injury: Instruct family/caregiver on patient safety     Problem: Skin/Tissue Integrity  Goal: Absence of new skin breakdown  Description: 1. Monitor for areas of redness and/or skin breakdown  2. Assess vascular access sites hourly  3. Every 4-6 hours minimum:  Change oxygen saturation probe site  4. Every 4-6 hours:  If on nasal continuous positive airway pressure, respiratory therapy assess nares and determine need for appliance change or resting period.   11/18/2022 1854 by Emma Anderson RN  Outcome: Progressing  11/18/2022 0537 by Yomaira Baez RN  Outcome: Progressing     Problem: Chronic Conditions and Co-morbidities  Goal: Patient's chronic conditions and co-morbidity symptoms are monitored and maintained or improved  11/18/2022 1854 by Emma Anderson RN  Outcome: Progressing  11/18/2022 0537 by Yomaira Baez RN  Outcome: Progressing

## 2022-11-18 NOTE — DISCHARGE INSTR - COC
Continuity of Care Form    Patient Name: Winnie Pham   :  1955  MRN:  [id number]    Admit date:  2022  Discharge date:  ***    Code Status Order: Full Code   Advance Directives:     Admitting Physician:  Hood Yadav MD  PCP: No primary care provider on file. Discharging Nurse: [facility] Unit/Room#: 5767/9358-62  Discharging Unit Phone Number: ***    Emergency Contact:   No emergency contact information on file. Past Surgical History:  No past surgical history on file. Immunization History: There is no immunization history on file for this patient.     Active Problems:  Patient Active Problem List   Diagnosis Code    Hyperkalemia E87.5    Admission for dialysis ([facility] Z99.2    Hypervolemia E87.70    ESRD on dialysis ([facility] N18.6, F48.1    Acute metabolic encephalopathy L30.79    Uremic encephalopathy G93.49, N19    Uremia N19    Primary hypertension I10    Insulin dependent type 2 diabetes mellitus ([facility] E11.9, Z79.4    Diabetic neuropathy (HCC) E11.40    Iron deficiency anemia D50.9    Elevated brain natriuretic peptide (BNP) level R79.89    Nausea and vomiting R11.2    Anemia of chronic disease D63.8       Isolation/Infection:   Isolation            No Isolation          Patient Infection Status       None to display            Nurse Assessment:  Last Vital Signs: /69   Pulse 77   Temp 97.7 °F (36.5 °C)   Resp 22   Ht 5' 9.5\" (1.765 m)   Wt 176 lb 5.9 oz (80 kg)   SpO2 98%   BMI 25.67 kg/m²     Last documented pain score (0-10 scale):    Last Weight:   Wt Readings from Last 1 Encounters:   22 176 lb 5.9 oz (80 kg)     Mental Status:  {IP PT MENTAL STATUS:}    IV Access:  { CORNELL IV ACCESS:128331777}    Nursing Mobility/ADLs:  Walking   {CHP DME PRQW:027207068}  Transfer  {CHP DME TBOP:889836600}  Bathing  {CHP DME JEBZ:329510070}  Dressing  {CHP DME LLYK:107107104}  Toileting  {CHP DME LFQX:262719283}  Feeding  {CHP DME UMLQ:985456626}  Med Admin {P DME OPBU:782985799}  Med Delivery   { CORNELL MED Delivery:841928647}    Wound Care Documentation and Therapy:        Elimination:  Continence: Bowel: {YES / SO:76384}  Bladder: {YES / BP:81121}  Urinary Catheter: {Urinary Catheter:912050711}   Colostomy/Ileostomy/Ileal Conduit: {YES / JL:49817}       Date of Last BM: ***  No intake or output data in the 24 hours ending 22 1327  I/O last 3 completed shifts:   In: 120 [P.O.:120]  Out: 175 [Urine:175]    Safety Concerns:     508 [employer] Safety Concerns:152395122}    Impairments/Disabilities:      [address] [employer] Impairments/Disabilities:629216514}    Nutrition Therapy:  Current Nutrition Therapy:   508 [employer] Diet List:012070467}    Routes of Feeding: {CHP DME Other Feedings:702241546}  Liquids: {Slp liquid thickness:85025}  Daily Fluid Restriction: {CHP DME Yes amt example:021524747}  Last Modified Barium Swallow with Video (Video Swallowing Test): {Done Not Done GLJO:859205566}    Treatments at the Time of Hospital Discharge:   Respiratory Treatments: ***  Oxygen Therapy:  {Therapy; copd oxygen:65314}  Ventilator:    { CC Vent CMKO:481811549}    Rehab Therapies: {THERAPEUTIC INTERVENTION:7020791984}  Weight Bearing Status/Restrictions: 508 [employer] Weight Bearin}  Other Medical Equipment (for information only, NOT a DME order):  {EQUIPMENT:927418461}  Other Treatments: ***    Patient's personal belongings (please select all that are sent with patient):  {CHP DME Belongings:673206833}    RN SIGNATURE:  {Esignature:693458079}    CASE MANAGEMENT/SOCIAL WORK SECTION    Inpatient Status Date: ***    Readmission Risk Assessment Score:  Readmission Risk              Risk of Unplanned Readmission:  22           Discharging to Facility/ Agency   Name:   Address:  Phone:  Fax:    Dialysis Facility (if applicable)   Name:  Address:  Dialysis Schedule:  Phone:  Fax:    / signature: {Esignature:278715232}    PHYSICIAN SECTION    Prognosis: Good    Condition at Discharge: Stable    Rehab Potential (if transferring to Rehab): Good    Recommended Labs or Other Treatments After Discharge: Resume Hemodialysis. Physician Certification: I certify the above information and transfer of Cr Pena  is necessary for the continuing treatment of the diagnosis listed and that he requires {Admit to Appropriate Level of Care:64522} for {GREATER/LESS:503922965} 30 days.      Update Admission H&P: No change in H&P    PHYSICIAN SIGNATURE:  Electronically signed by Reyes Beebe MD on 11/18/22 at 12:22 PM EST

## 2022-11-18 NOTE — CARE COORDINATION
Contacted several Psychiatric depts to attempt to find pt's car   - we were able to find pt's ASHKAN # and license plate # off of a tire receipt pt had in his backpack. [employer] - no involvement with pt's car  [address] - no involvement  [facility] - no involvement  [facility] - no involvement  [employer] - no involvement  SHELTON Dow "[employer] - message left  [address] - no involvement    RAGHAV and Galdino Echavarria CM, met with pt. Discussed options at discharge - shelter here, motel here (stated he does not have enough money), return [facility] [place], or return to where he was living in [place], [place]. Pt has decided to return to [place]. Explained to pt that the [employer] does not go to [place], but rather [place] (which is half hour away). Discussed once he got to [place], he would need to take a cab to [place]. Discussed that SW will contact homeless shelters in [facility] - pt agreeable. Pt has a dialysis unit at [facility] and has a chair time of MWF 4:45p.      SW contacted [facility] ([phone]) in [place], [place] - no male beds available. Called [facility] ([phone]) - VM stated they are not issuing any hotel vouchers and to contact [employer] or [employer]. [employer] ([phone]) - message left.  [address] [place] [address] ([phone]) - they are a 24hr shelter that is admitted by lottery and they have an overnight emergency shelter - they must go to the [employer] to sign up - .    RAGHAV /ALFIE will cont to work on arrangements/dc plans

## 2022-11-18 NOTE — PROGRESS NOTES
CLINICAL PHARMACY NOTE: MEDS TO BEDS    Total # of Prescriptions Filled: 3   The following medications were delivered to the patient:  Glipizide 5mg tabs  Latuda 40mg tabs  Carvedilol 25mg tabs    Additional Documentation:   Delivered to pt + 1 in room 448 on 11/18 at 2:38PM. Pt did not want the ferrous sulfate pills so the copay was 0.

## 2022-11-18 NOTE — PLAN OF CARE
Problem: ABCDS Injury Assessment  Goal: Absence of physical injury  11/18/2022 0537 by Bryan Juan RN  Outcome: Progressing  Flowsheets (Taken 11/17/2022 2000)  Absence of Physical Injury: Implement safety measures based on patient assessment  11/17/2022 1830 by Marina Guzman RN  Outcome: Progressing     Problem: Safety - Adult  Goal: Free from fall injury  11/18/2022 0537 by Bryan Juan RN  Outcome: Progressing  Flowsheets (Taken 11/17/2022 2000)  Free From Fall Injury: Instruct family/caregiver on patient safety  11/17/2022 1830 by Marian Guzman RN  Outcome: Progressing     Problem: Skin/Tissue Integrity  Goal: Absence of new skin breakdown  Description: 1. Monitor for areas of redness and/or skin breakdown  2. Assess vascular access sites hourly  3. Every 4-6 hours minimum:  Change oxygen saturation probe site  4. Every 4-6 hours:  If on nasal continuous positive airway pressure, respiratory therapy assess nares and determine need for appliance change or resting period.   11/18/2022 0537 by Brayn Juan RN  Outcome: Progressing  11/17/2022 1830 by Marian Guzman RN  Outcome: Progressing     Problem: Chronic Conditions and Co-morbidities  Goal: Patient's chronic conditions and co-morbidity symptoms are monitored and maintained or improved  11/18/2022 0537 by Bryan Juan RN  Outcome: Progressing  11/17/2022 1830 by Marian Guzman RN  Outcome: Progressing

## 2022-11-18 NOTE — DISCHARGE INSTRUCTIONS
You were seen and admitted you were seen and admitted for altered mentation due to missed dialysis. Please resume your dialysis in [place] as per  Monday Wednesday Friday schedule.   Please take your medications as prescribed  please return if you have any worsening of your symptoms

## 2022-11-18 NOTE — PROGRESS NOTES
Renal Progress Note    Patient :  Prosper Almendarez; 73 y.o. MRN# [id number]  Location:  [facility]/1486-18  Attending:  Yimi Green MD  Admit Date:  2022   Hospital Day: 5      Subjective:       Patient was seen and examined HD. Tolerating the procedure well. No new issues reported overnight. He normally gets dialysis as per MWF schedule.  did confirm an outpatient dialysis spot at [facility] in [place] on MWF schedule. Patient was cleared for discharge from psych standpoint.  working towards placement. Outpatient Medications:     Medications Prior to Admission: aspirin 81 MG chewable tablet, Take 81 mg by mouth as needed for Pain    Current Medications:     Scheduled Meds:    fluticasone  1 spray Each Nostril Daily    lurasidone  40 mg Oral Daily    aspirin  81 mg Oral Daily    amLODIPine  10 mg Oral Daily    ferrous sulfate  325 mg Oral Daily with breakfast    carvedilol  25 mg Oral BID WC    sodium chloride flush  5-40 mL IntraVENous 2 times per day    heparin (porcine)  5,000 Units SubCUTAneous 3 times per day    insulin lispro  0-8 Units SubCUTAneous TID WC    insulin lispro  0-4 Units SubCUTAneous Nightly     Continuous Infusions:    dextrose      sodium chloride       PRN Meds:  hypromellose, glucose, dextrose bolus **OR** dextrose bolus, glucagon (rDNA), dextrose, sodium chloride flush, sodium chloride, ondansetron **OR** ondansetron, polyethylene glycol, acetaminophen **OR** acetaminophen, labetalol    Input/Output:       I/O last 3 completed shifts: In: 120 [P.O.:120]  Out: 175 [Urine:175].     Patient Vitals for the past 96 hrs (Last 3 readings):   Weight   22 0950 176 lb 5.9 oz (80 kg)   22 1355 171 lb 11.8 oz (77.9 kg)   22 1005 174 lb 13.2 oz (79.3 kg)     Vital Signs:   Temperature:  Temp: 97.7 °F (36.5 °C)  TMax:   Temp (24hrs), Av.6 °F (36.4 °C), Min:97.5 °F (36.4 °C), Max:97.7 °F (36.5 °C)    Respirations:  Resp: 22  Pulse:   Heart Rate: 77  BP:    BP: 139/69  BP Range: Systolic (02KBD), CLP:338 , Min:130 , FRX:187       Diastolic (20JIC), PXJ:09, Min:68, Max:74      Physical Examination:     General:  AAO x 3, speaking in full sentences, no accessory muscle use. HEENT: Atraumatic, normocephalic, no throat congestion, moist mucosa. Eyes:   Pupils equal, round and reactive to light, EOMI. Neck:   Supple  Chest:   Bilateral vesicular breath sounds, no rales or wheezes. Cardiac:  S1 S2 RR, no murmurs, gallops or rubs. Abdomen: Soft, non-tender, no masses or organomegaly, BS audible. :   No suprapubic or flank tenderness. Neuro:  AAO x 3, No FND. SKIN:  No rashes, good skin turgor. Extremities:  No edema. Labs:       Recent Labs     11/16/22  0602 11/17/22  0628 11/18/22  0822   WBC 5.3 5.4 5.8   RBC 3.33* 2.69* 2.87*   HGB 10.5* 8.9* 9.1*   HCT 31.7* 26.7* 28.6*   MCV 95.2 99.3 99.7   MCH 31.5 33.1 31.7   MCHC 33.1 33.3 31.8   RDW 13.4 13.5 13.4    540* 411   MPV 9.0 9.9 9.1      BMP:   Recent Labs     11/16/22  0602 11/17/22  0628 11/18/22  0822    136 133*   K 4.6 4.4 5.8*   CL 95* 96* 91*   CO2 25 25 22   BUN 54* 39* 59*   CREATININE 8.54* 6.22* 8.42*   GLUCOSE 153* 154* 206*   CALCIUM 7.5* 7.7* 7.7*     SPEP:  Lab Results   Component Value Date/Time    PROT 7.6 11/13/2022 09:20 AM     Radiology:     Reviewed. Assessment:     ESRD on Hemodialysis. His regular HD days are Monday Wednesday Friday at [facility] Hemodialysis facility in [address] [place] [address] under Dr. Anup Cohen via left upper arm fistula. Weakness. Nausea and vomiting. Missed dialysis. Anemia of chronic disease  Secondary hyperparathyroidism  Hypertension  Diabetes type 2  Hyperkalemia -improved    Plan:   Patient was seen and examined on HD at bedside. Orders were confirmed with the HD nurse. Keep low potassium low phosphorus diet. Fluid restriction to 1500 mL/day.    did confirm an outpatient dialysis spot at [facility] in [place] on MWF schedule. Okay to discharge from nephrology standpoint. Nutrition   Please ensure that patient is on a renal diet/TF. Avoid nephrotoxic drugs/contrast exposure. Chris Navarrete MD  [facility]     This note is created with the assistance of a speech-recognition program. While intending to generate a document that actually reflects the content of the visit, no guarantees can be provided that every mistake has been identified and corrected by editing.

## 2022-11-19 LAB
ABSOLUTE EOS #: 0.54 K/UL (ref 0–0.44)
ABSOLUTE IMMATURE GRANULOCYTE: <0.03 K/UL (ref 0–0.3)
ABSOLUTE LYMPH #: 2.98 K/UL (ref 1.1–3.7)
ABSOLUTE MONO #: 0.56 K/UL (ref 0.1–1.2)
ANION GAP SERPL CALCULATED.3IONS-SCNC: 15 MMOL/L (ref 9–17)
BASOPHILS # BLD: 1 % (ref 0–2)
BASOPHILS ABSOLUTE: 0.06 K/UL (ref 0–0.2)
BUN BLDV-MCNC: 37 MG/DL (ref 8–23)
CALCIUM SERPL-MCNC: 7.8 MG/DL (ref 8.6–10.4)
CHLORIDE BLD-SCNC: 97 MMOL/L (ref 98–107)
CO2: 26 MMOL/L (ref 20–31)
CREAT SERPL-MCNC: 5.89 MG/DL (ref 0.7–1.2)
EOSINOPHILS RELATIVE PERCENT: 9 % (ref 1–4)
GFR SERPL CREATININE-BSD FRML MDRD: 10 ML/MIN/1.73M2
GLUCOSE BLD-MCNC: 139 MG/DL (ref 70–99)
GLUCOSE BLD-MCNC: 144 MG/DL (ref 75–110)
GLUCOSE BLD-MCNC: 160 MG/DL (ref 75–110)
GLUCOSE BLD-MCNC: 162 MG/DL (ref 75–110)
GLUCOSE BLD-MCNC: 196 MG/DL (ref 75–110)
HCT VFR BLD CALC: 29.1 % (ref 40.7–50.3)
HEMOGLOBIN: 9.1 G/DL (ref 13–17)
IMMATURE GRANULOCYTES: 0 %
LYMPHOCYTES # BLD: 52 % (ref 24–43)
MCH RBC QN AUTO: 31.3 PG (ref 25.2–33.5)
MCHC RBC AUTO-ENTMCNC: 31.3 G/DL (ref 28.4–34.8)
MCV RBC AUTO: 100 FL (ref 82.6–102.9)
MONOCYTES # BLD: 10 % (ref 3–12)
NRBC AUTOMATED: 0 PER 100 WBC
PDW BLD-RTO: 13.2 % (ref 11.8–14.4)
PLATELET # BLD: 426 K/UL (ref 138–453)
PMV BLD AUTO: 8.9 FL (ref 8.1–13.5)
POTASSIUM SERPL-SCNC: 4.4 MMOL/L (ref 3.7–5.3)
RBC # BLD: 2.91 M/UL (ref 4.21–5.77)
SEG NEUTROPHILS: 28 % (ref 36–65)
SEGMENTED NEUTROPHILS ABSOLUTE COUNT: 1.6 K/UL (ref 1.5–8.1)
SODIUM BLD-SCNC: 138 MMOL/L (ref 135–144)
WBC # BLD: 5.8 K/UL (ref 3.5–11.3)

## 2022-11-19 PROCEDURE — 6370000000 HC RX 637 (ALT 250 FOR IP)

## 2022-11-19 PROCEDURE — 85025 COMPLETE CBC W/AUTO DIFF WBC: CPT

## 2022-11-19 PROCEDURE — 1200000000 HC SEMI PRIVATE

## 2022-11-19 PROCEDURE — 82947 ASSAY GLUCOSE BLOOD QUANT: CPT

## 2022-11-19 PROCEDURE — 2580000003 HC RX 258: Performed by: STUDENT IN AN ORGANIZED HEALTH CARE EDUCATION/TRAINING PROGRAM

## 2022-11-19 PROCEDURE — 6370000000 HC RX 637 (ALT 250 FOR IP): Performed by: PSYCHIATRY & NEUROLOGY

## 2022-11-19 PROCEDURE — 6370000000 HC RX 637 (ALT 250 FOR IP): Performed by: INTERNAL MEDICINE

## 2022-11-19 PROCEDURE — 36415 COLL VENOUS BLD VENIPUNCTURE: CPT

## 2022-11-19 PROCEDURE — 80048 BASIC METABOLIC PNL TOTAL CA: CPT

## 2022-11-19 PROCEDURE — 99232 SBSQ HOSP IP/OBS MODERATE 35: CPT | Performed by: INTERNAL MEDICINE

## 2022-11-19 RX ADMIN — CARVEDILOL 25 MG: 25 TABLET, FILM COATED ORAL at 09:19

## 2022-11-19 RX ADMIN — SODIUM CHLORIDE, PRESERVATIVE FREE 10 ML: 5 INJECTION INTRAVENOUS at 20:55

## 2022-11-19 RX ADMIN — FERROUS SULFATE TAB EC 325 MG (65 MG FE EQUIVALENT) 325 MG: 325 (65 FE) TABLET DELAYED RESPONSE at 09:19

## 2022-11-19 RX ADMIN — AMLODIPINE BESYLATE 10 MG: 10 TABLET ORAL at 09:19

## 2022-11-19 RX ADMIN — LURASIDONE HYDROCHLORIDE 40 MG: 40 TABLET, FILM COATED ORAL at 09:19

## 2022-11-19 RX ADMIN — ASPIRIN 81 MG: 81 TABLET, CHEWABLE ORAL at 09:18

## 2022-11-19 RX ADMIN — CARVEDILOL 25 MG: 25 TABLET, FILM COATED ORAL at 17:35

## 2022-11-19 RX ADMIN — FLUTICASONE PROPIONATE 1 SPRAY: 50 SPRAY, METERED NASAL at 09:19

## 2022-11-19 RX ADMIN — SODIUM CHLORIDE, PRESERVATIVE FREE 10 ML: 5 INJECTION INTRAVENOUS at 09:19
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
## 2022-11-19 NOTE — PLAN OF CARE
Problem: ABCDS Injury Assessment  Goal: Absence of physical injury  11/19/2022 0437 by Nydia Mota RN  Outcome: Progressing  Flowsheets (Taken 11/18/2022 2001)  Absence of Physical Injury: Implement safety measures based on patient assessment  11/18/2022 1854 by Mando Chen RN  Outcome: Progressing     Problem: Safety - Adult  Goal: Free from fall injury  11/19/2022 0437 by Nydia Mota RN  Outcome: Progressing  Flowsheets (Taken 11/18/2022 2001)  Free From Fall Injury: Instruct family/caregiver on patient safety  11/18/2022 1854 by Mando Chen RN  Outcome: Progressing     Problem: Skin/Tissue Integrity  Goal: Absence of new skin breakdown  Description: 1. Monitor for areas of redness and/or skin breakdown  2. Assess vascular access sites hourly  3. Every 4-6 hours minimum:  Change oxygen saturation probe site  4. Every 4-6 hours:  If on nasal continuous positive airway pressure, respiratory therapy assess nares and determine need for appliance change or resting period.   11/19/2022 0437 by Nydia Mota RN  Outcome: Progressing  11/18/2022 1854 by Mando Chen RN  Outcome: Progressing     Problem: Chronic Conditions and Co-morbidities  Goal: Patient's chronic conditions and co-morbidity symptoms are monitored and maintained or improved  11/19/2022 0437 by Nydia Mota RN  Outcome: Progressing  11/18/2022 1854 by Mando Chen RN  Outcome: Progressing

## 2022-11-19 NOTE — PROGRESS NOTES
Renal Progress Note    Patient :  Marie Alcaraz; 73 y.o. MRN# [id number]  Location:  7057/5571-79  Attending:  Puma Fountain MD  Admit Date:  11/13/2022   Hospital Day: 6      Subjective:       Patient was seen and and examined at bedside. He tolerated hemodialysis yesterday with net removal of 1500 mL for 210 minutes. Blood pressure is 150/66, pulse 80. Remain on room air. Labs reviewed and evaluated:    Sodium 130 potassium 4.4 chloride 97. BUN 37, creatinine 5.89. He normally gets dialysis as per MWF schedule.  did confirm an outpatient dialysis spot at [facility] in [place] on MWF schedule.  working towards placement. Outpatient Medications:     Medications Prior to Admission: aspirin 81 MG chewable tablet, Take 81 mg by mouth as needed for Pain    Current Medications:     Scheduled Meds:    fluticasone  1 spray Each Nostril Daily    lurasidone  40 mg Oral Daily    aspirin  81 mg Oral Daily    amLODIPine  10 mg Oral Daily    ferrous sulfate  325 mg Oral Daily with breakfast    carvedilol  25 mg Oral BID WC    sodium chloride flush  5-40 mL IntraVENous 2 times per day    heparin (porcine)  5,000 Units SubCUTAneous 3 times per day    insulin lispro  0-8 Units SubCUTAneous TID WC    insulin lispro  0-4 Units SubCUTAneous Nightly     Continuous Infusions:    dextrose      sodium chloride       PRN Meds:  hypromellose, glucose, dextrose bolus **OR** dextrose bolus, glucagon (rDNA), dextrose, sodium chloride flush, sodium chloride, ondansetron **OR** ondansetron, polyethylene glycol, acetaminophen **OR** acetaminophen, labetalol    Input/Output:       I/O last 3 completed shifts: In: 310 [I.V.:10]  Out: 1800 .     Patient Vitals for the past 96 hrs (Last 3 readings):   Weight   11/18/22 1400 175 lb 4.3 oz (79.5 kg)   11/18/22 0950 176 lb 5.9 oz (80 kg)   11/16/22 1355 171 lb 11.8 oz (77.9 kg)       Vital Signs:   Temperature:  Temp: 98.1 °F (36.7 °C)  TMax: Temp (24hrs), Av.8 °F (36.6 °C), Min:97.1 °F (36.2 °C), Max:98.1 °F (36.7 °C)    Respirations:  Resp: 18  Pulse:   Heart Rate: 80  BP:    BP: (!) 151/66  BP Range: Systolic (18YPN), FEP:022 , Min:130 , NJB:819       Diastolic (33CWU), PEA:96, Min:66, Max:90      Physical Examination:     General:  AAO x 3, speaking in full sentences, no accessory muscle use. HEENT: Atraumatic, normocephalic, no throat congestion, moist mucosa. Eyes:   Pupils equal, round and reactive to light, EOMI. Neck:   Supple  Chest:   Bilateral vesicular breath sounds, no rales or wheezes. Cardiac:  S1 S2 RR, no murmurs, gallops or rubs. Abdomen: Soft, non-tender, no masses or organomegaly, BS audible. :   No suprapubic or flank tenderness. Neuro:  AAO x 3, No FND. SKIN:  No rashes, good skin turgor. Extremities:  No edema. Labs:       Recent Labs     22  0628 22  0822 22  0539   WBC 5.4 5.8 5.8   RBC 2.69* 2.87* 2.91*   HGB 8.9* 9.1* 9.1*   HCT 26.7* 28.6* 29.1*   MCV 99.3 99.7 100.0   MCH 33.1 31.7 31.3   MCHC 33.3 31.8 31.3   RDW 13.5 13.4 13.2   * 411 426   MPV 9.9 9.1 8.9        BMP:   Recent Labs     22  0628 22  0822 22  0539    133* 138   K 4.4 5.8* 4.4   CL 96* 91* 97*   CO2 25 22 26   BUN 39* 59* 37*   CREATININE 6.22* 8.42* 5.89*   GLUCOSE 154* 206* 139*   CALCIUM 7.7* 7.7* 7.8*       SPEP:  Lab Results   Component Value Date/Time    PROT 7.6 2022 09:20 AM     Radiology:     Reviewed. Assessment:     ESRD on Hemodialysis. His regular HD days are  at [facility] Hemodialysis facility in [address] [place] [address] under Dr. Yadira Kirk via left upper arm fistula. Weakness. Nausea and vomiting. Missed dialysis. Anemia of chronic disease  Secondary hyperparathyroidism  Hypertension  Diabetes type 2  Hyperkalemia -improved    Plan: Will resume MWF dialysis schedule. No need of hemodialysis today. Keep low potassium low phosphorus diet.   Fluid restriction to 1500 mL/day.  did confirm an outpatient dialysis spot at [facility] in [place] on MWF schedule. Okay to discharge from nephrology standpoint. Nutrition   Please ensure that patient is on a renal diet/TF. Avoid nephrotoxic drugs/contrast exposure. Herminio Oseguera MD  Internal Medicine Resident, PGY-2  [address]  11/19/2022,9:32 AM    Attending Physician Statement  I have discussed the care of this patient, including pertinent history and exam findings, with the Resident/CNP. I have reviewed and edited the key elements of all parts of the encounter with the Resident/CNP. I agree with the assessment, plan and orders as documented by the Resident/CNP. Chris Thapa MD   Nephrology [address] [place] [address] Drive    This note is created with the assistance of a speech-recognition program. While intending to generate a document that actually reflects the content of the visit, no guarantees can be provided that every mistake has been identified and corrected by editing.

## 2022-11-20 LAB
ABSOLUTE EOS #: 0.77 K/UL (ref 0–0.44)
ABSOLUTE IMMATURE GRANULOCYTE: <0.03 K/UL (ref 0–0.3)
ABSOLUTE LYMPH #: 2.91 K/UL (ref 1.1–3.7)
ABSOLUTE MONO #: 0.57 K/UL (ref 0.1–1.2)
ANION GAP SERPL CALCULATED.3IONS-SCNC: 20 MMOL/L (ref 9–17)
BASOPHILS # BLD: 1 % (ref 0–2)
BASOPHILS ABSOLUTE: 0.06 K/UL (ref 0–0.2)
BUN BLDV-MCNC: 63 MG/DL (ref 8–23)
CALCIUM SERPL-MCNC: 7.9 MG/DL (ref 8.6–10.4)
CHLORIDE BLD-SCNC: 97 MMOL/L (ref 98–107)
CO2: 22 MMOL/L (ref 20–31)
CREAT SERPL-MCNC: 8.59 MG/DL (ref 0.7–1.2)
EOSINOPHILS RELATIVE PERCENT: 13 % (ref 1–4)
GFR SERPL CREATININE-BSD FRML MDRD: 6 ML/MIN/1.73M2
GLUCOSE BLD-MCNC: 144 MG/DL (ref 75–110)
GLUCOSE BLD-MCNC: 162 MG/DL (ref 75–110)
GLUCOSE BLD-MCNC: 163 MG/DL (ref 70–99)
GLUCOSE BLD-MCNC: 215 MG/DL (ref 75–110)
GLUCOSE BLD-MCNC: 215 MG/DL (ref 75–110)
HCT VFR BLD CALC: 31.3 % (ref 40.7–50.3)
HEMOGLOBIN: 10.2 G/DL (ref 13–17)
IMMATURE GRANULOCYTES: 0 %
LYMPHOCYTES # BLD: 47 % (ref 24–43)
MCH RBC QN AUTO: 31 PG (ref 25.2–33.5)
MCHC RBC AUTO-ENTMCNC: 32.6 G/DL (ref 28.4–34.8)
MCV RBC AUTO: 95.1 FL (ref 82.6–102.9)
MONOCYTES # BLD: 9 % (ref 3–12)
NRBC AUTOMATED: 0 PER 100 WBC
PDW BLD-RTO: 13.5 % (ref 11.8–14.4)
PLATELET # BLD: 450 K/UL (ref 138–453)
PMV BLD AUTO: 9 FL (ref 8.1–13.5)
POTASSIUM SERPL-SCNC: 5.1 MMOL/L (ref 3.7–5.3)
RBC # BLD: 3.29 M/UL (ref 4.21–5.77)
SEG NEUTROPHILS: 30 % (ref 36–65)
SEGMENTED NEUTROPHILS ABSOLUTE COUNT: 1.81 K/UL (ref 1.5–8.1)
SODIUM BLD-SCNC: 139 MMOL/L (ref 135–144)
WBC # BLD: 6.1 K/UL (ref 3.5–11.3)

## 2022-11-20 PROCEDURE — 36415 COLL VENOUS BLD VENIPUNCTURE: CPT

## 2022-11-20 PROCEDURE — 6370000000 HC RX 637 (ALT 250 FOR IP): Performed by: INTERNAL MEDICINE

## 2022-11-20 PROCEDURE — 6370000000 HC RX 637 (ALT 250 FOR IP): Performed by: PSYCHIATRY & NEUROLOGY

## 2022-11-20 PROCEDURE — 90935 HEMODIALYSIS ONE EVALUATION: CPT

## 2022-11-20 PROCEDURE — 6370000000 HC RX 637 (ALT 250 FOR IP): Performed by: STUDENT IN AN ORGANIZED HEALTH CARE EDUCATION/TRAINING PROGRAM

## 2022-11-20 PROCEDURE — 2580000003 HC RX 258: Performed by: STUDENT IN AN ORGANIZED HEALTH CARE EDUCATION/TRAINING PROGRAM

## 2022-11-20 PROCEDURE — 6370000000 HC RX 637 (ALT 250 FOR IP)

## 2022-11-20 PROCEDURE — 83036 HEMOGLOBIN GLYCOSYLATED A1C: CPT

## 2022-11-20 PROCEDURE — 85027 COMPLETE CBC AUTOMATED: CPT

## 2022-11-20 PROCEDURE — 1200000000 HC SEMI PRIVATE

## 2022-11-20 PROCEDURE — 80048 BASIC METABOLIC PNL TOTAL CA: CPT

## 2022-11-20 PROCEDURE — 90935 HEMODIALYSIS ONE EVALUATION: CPT | Performed by: INTERNAL MEDICINE

## 2022-11-20 PROCEDURE — 82947 ASSAY GLUCOSE BLOOD QUANT: CPT

## 2022-11-20 PROCEDURE — 99233 SBSQ HOSP IP/OBS HIGH 50: CPT | Performed by: INTERNAL MEDICINE

## 2022-11-20 RX ADMIN — CARVEDILOL 25 MG: 25 TABLET, FILM COATED ORAL at 17:56

## 2022-11-20 RX ADMIN — LURASIDONE HYDROCHLORIDE 40 MG: 40 TABLET, FILM COATED ORAL at 09:04

## 2022-11-20 RX ADMIN — FERROUS SULFATE TAB EC 325 MG (65 MG FE EQUIVALENT) 325 MG: 325 (65 FE) TABLET DELAYED RESPONSE at 09:04

## 2022-11-20 RX ADMIN — ASPIRIN 81 MG: 81 TABLET, CHEWABLE ORAL at 09:04

## 2022-11-20 RX ADMIN — AMLODIPINE BESYLATE 10 MG: 10 TABLET ORAL at 09:04

## 2022-11-20 RX ADMIN — SODIUM CHLORIDE, PRESERVATIVE FREE 10 ML: 5 INJECTION INTRAVENOUS at 09:05

## 2022-11-20 RX ADMIN — CARVEDILOL 25 MG: 25 TABLET, FILM COATED ORAL at 09:04

## 2022-11-20 RX ADMIN — INSULIN LISPRO 2 UNITS: 100 INJECTION, SOLUTION INTRAVENOUS; SUBCUTANEOUS at 17:55

## 2022-11-20 NOTE — PROGRESS NOTES
Dialysis Post Treatment Note  Vitals:    11/20/22 1405   BP: (!) 169/82   Pulse: 81   Resp: 18   Temp: 97.7 °F (36.5 °C)     Pre-Weight = 79.8 kg  Post-weight = Weight: 171 lb 15.3 oz (78 kg)  Total Liters Processed = Blood Volume Processed (Liters): 89.7 l/min  Rinseback Volume (mL) = Rinseback Volume (ml): 300 ml  Net Removal (mL) =  1.5L  Patient's dry weight=   Type of access used=LUE fistula  Length of treatment=3.5 hrs    Pt tolerated tx well without complaints. Pt discharged back to room via wheelchair.

## 2022-11-20 NOTE — PROGRESS NOTES
Dialysis Time Out  To be done by RN and tech or 2 RNs  Staff Names [id number] RN    [x]  Identity of the patient using 2 patient identifiers  [x]  Consent for treatment  [x]  Equipment-proper machine and dialyzer  [x]  B-Hep B status  [x]  Orders- to include bath, blood flow, dialyzer, time and fluid removal  [x]  Access-Correct site and in working order  [x]  Time for patient to ask questions.

## 2022-11-20 NOTE — PROGRESS NOTES
Renal Progress Note    Patient :  Shawn Brito; 73 y.o. MRN# [id number]  Location:  495/1806-27  Attending:  Ann Muir MD  Admit Date:  11/13/2022   Hospital Day: 7      Subjective:       Patient seen and examined on HD. Tolerating the procedure well. No significant event overnight. Patient denies shortness of breath nausea vomiting diarrhea. Blood pressure is 143/65, pulse 95. On room air. Labs reviewed and evaluated. Morning BMP reviewed K 5.1, Bicarb 22. He normally gets dialysis as per MWF schedule.  did confirm an outpatient dialysis spot at [facility] in [place] on MWF schedule.  working towards placement. Outpatient Medications:     Medications Prior to Admission: aspirin 81 MG chewable tablet, Take 81 mg by mouth as needed for Pain    Current Medications:     Scheduled Meds:    fluticasone  1 spray Each Nostril Daily    lurasidone  40 mg Oral Daily    aspirin  81 mg Oral Daily    amLODIPine  10 mg Oral Daily    ferrous sulfate  325 mg Oral Daily with breakfast    carvedilol  25 mg Oral BID WC    sodium chloride flush  5-40 mL IntraVENous 2 times per day    heparin (porcine)  5,000 Units SubCUTAneous 3 times per day    insulin lispro  0-8 Units SubCUTAneous TID WC    insulin lispro  0-4 Units SubCUTAneous Nightly     Continuous Infusions:    dextrose      sodium chloride       PRN Meds:  hypromellose, glucose, dextrose bolus **OR** dextrose bolus, glucagon (rDNA), dextrose, sodium chloride flush, sodium chloride, ondansetron **OR** ondansetron, polyethylene glycol, acetaminophen **OR** acetaminophen, labetalol    Input/Output:       I/O last 3 completed shifts:   In: 15 [I.V.:15]  Out: - .    Patient Vitals for the past 96 hrs (Last 3 readings):   Weight   11/18/22 1400 175 lb 4.3 oz (79.5 kg)   11/18/22 0950 176 lb 5.9 oz (80 kg)   11/16/22 1355 171 lb 11.8 oz (77.9 kg)       Vital Signs:   Temperature:  Temp: 98.6 °F (37 °C)  TMax:   Temp (24hrs), Av.6 °F (37 °C), Min:98.6 °F (37 °C), Max:98.6 °F (37 °C)    Respirations:  Resp: 16  Pulse:   Heart Rate: 75  BP:    BP: (!) 143/65  BP Range: Systolic (37BJW), JRH:667 , Min:122 , ZGX:687       Diastolic (40INA), FKU:90, Min:61, Max:73      Physical Examination:     General:  AAO x 3, speaking in full sentences, no accessory muscle use. HEENT: Atraumatic, normocephalic, no throat congestion, moist mucosa. Eyes:   Pupils equal, round and reactive to light, EOMI. Neck:   Supple  Chest:   Bilateral vesicular breath sounds, no rales or wheezes. Cardiac:  S1 S2 RR, no murmurs, gallops or rubs. Abdomen: Soft, non-tender, no masses or organomegaly, BS audible. :   No suprapubic or flank tenderness. Neuro:  AAO x 3, No FND. SKIN:  No rashes, good skin turgor. Extremities:  No edema. Labs:       Recent Labs     22  0822  0539   WBC 5.8 5.8   RBC 2.87* 2.91*   HGB 9.1* 9.1*   HCT 28.6* 29.1*   MCV 99.7 100.0   MCH 31.7 31.3   MCHC 31.8 31.3   RDW 13.4 13.2    426   MPV 9.1 8.9        BMP:   Recent Labs     22  0822 22  0539   * 138   K 5.8* 4.4   CL 91* 97*   CO2 22 26   BUN 59* 37*   CREATININE 8.42* 5.89*   GLUCOSE 206* 139*   CALCIUM 7.7* 7.8*       SPEP:  Lab Results   Component Value Date/Time    PROT 7.6 2022 09:20 AM     Radiology:     Reviewed. Assessment:     ESRD on Hemodialysis. His regular HD days are  at [facility] Hem[facility] facility in [address] [place] [address] under Dr. Benton Kamara via left upper arm fistula. Weakness. Nausea and vomiting. Resolved  Missed dialysis. Anemia of chronic disease  Secondary hyperparathyroidism  Hypertension  Diabetes type 2  Hyperkalemia -improved    Plan:   Patient was seen and examined on HD at bedside. Orders were confirmed with the HD nurse. Running today due to modified holiday schedule. Keep low potassium low phosphorus diet. Fluid restriction to 1500 mL/day.    did confirm an outpatient dialysis spot at [facility] in [place] on MWF schedule. Okay to discharge from nephrology standpoint. Nutrition   Please ensure that patient is on a renal diet/TF. Avoid nephrotoxic drugs/contrast exposure. Herrera Busch MD  Internal Medicine Resident, PGY-2  [address]  50/04/9683,5:59 AM    Attending Physician Statement  I have discussed the care of this patient, including pertinent history and exam findings, with the Resident/CNP. I have reviewed and edited the key elements of all parts of the encounter with the Resident/CNP. I agree with the assessment, plan and orders as documented by the Resident/CNP. Chris Whitfield MD   Nephrology [address] [place] [address] Drive    This note is created with the assistance of a speech-recognition program. While intending to generate a document that actually reflects the content of the visit, no guarantees can be provided that every mistake has been identified and corrected by editing.

## 2022-11-20 NOTE — PLAN OF CARE
Problem: ABCDS Injury Assessment  Goal: Absence of physical injury  11/20/2022 0448 by Enrique Freed RN  Outcome: Progressing  Flowsheets (Taken 11/19/2022 1935)  Absence of Physical Injury: Implement safety measures based on patient assessment  11/19/2022 1856 by Nora Cabrera RN  Outcome: Progressing     Problem: Safety - Adult  Goal: Free from fall injury  11/20/2022 0448 by Enrique Freed RN  Outcome: Carlos Ojeda (Taken 11/19/2022 1935)  Free From Fall Injury: Instruct family/caregiver on patient safety  11/19/2022 1856 by Nora Cabrera RN  Outcome: Progressing     Problem: Skin/Tissue Integrity  Goal: Absence of new skin breakdown  Description: 1. Monitor for areas of redness and/or skin breakdown  2. Assess vascular access sites hourly  3. Every 4-6 hours minimum:  Change oxygen saturation probe site  4. Every 4-6 hours:  If on nasal continuous positive airway pressure, respiratory therapy assess nares and determine need for appliance change or resting period.   11/20/2022 0448 by Enrique Freed RN  Outcome: Progressing  11/19/2022 1856 by Nora Cabrera RN  Outcome: Progressing     Problem: Chronic Conditions and Co-morbidities  Goal: Patient's chronic conditions and co-morbidity symptoms are monitored and maintained or improved  11/20/2022 0448 by Enrique Freed RN  Outcome: Progressing  11/19/2022 1856 by Nora Cabrera RN  Outcome: Progressing
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
## 2022-11-20 NOTE — PLAN OF CARE
Problem: ABCDS Injury Assessment  Goal: Absence of physical injury  11/20/2022 1633 by Duke Dorantes RN  Outcome: Progressing  11/20/2022 0448 by Leonor Wild RN  Outcome: Progressing  Flowsheets (Taken 11/19/2022 1935)  Absence of Physical Injury: Implement safety measures based on patient assessment     Problem: Safety - Adult  Goal: Free from fall injury  11/20/2022 1633 by Duke Dorantes RN  Outcome: Progressing  11/20/2022 0448 by Leonor Wild RN  Outcome: Progressing  Flowsheets (Taken 11/19/2022 1935)  Free From Fall Injury: Instruct family/caregiver on patient safety     Problem: Skin/Tissue Integrity  Goal: Absence of new skin breakdown  Description: 1. Monitor for areas of redness and/or skin breakdown  2. Assess vascular access sites hourly  3. Every 4-6 hours minimum:  Change oxygen saturation probe site  4. Every 4-6 hours:  If on nasal continuous positive airway pressure, respiratory therapy assess nares and determine need for appliance change or resting period.   11/20/2022 1633 by Duke Dorantes RN  Outcome: Progressing  11/20/2022 0448 by Leonor Wild RN  Outcome: Progressing     Problem: Chronic Conditions and Co-morbidities  Goal: Patient's chronic conditions and co-morbidity symptoms are monitored and maintained or improved  11/20/2022 1633 by Duke Dorantes RN  Outcome: Progressing  11/20/2022 0448 by Leonor Wild RN  Outcome: Progressing

## 2022-11-21 VITALS
TEMPERATURE: 98.3 F | WEIGHT: 171.96 LBS | SYSTOLIC BLOOD PRESSURE: 132 MMHG | HEIGHT: 70 IN | RESPIRATION RATE: 18 BRPM | OXYGEN SATURATION: 95 % | BODY MASS INDEX: 24.62 KG/M2 | DIASTOLIC BLOOD PRESSURE: 60 MMHG | HEART RATE: 85 BPM

## 2022-11-21 LAB
ABSOLUTE EOS #: 0.69 K/UL (ref 0–0.44)
ABSOLUTE IMMATURE GRANULOCYTE: <0.03 K/UL (ref 0–0.3)
ABSOLUTE LYMPH #: 2.85 K/UL (ref 1.1–3.7)
ABSOLUTE MONO #: 0.54 K/UL (ref 0.1–1.2)
ANION GAP SERPL CALCULATED.3IONS-SCNC: 14 MMOL/L (ref 9–17)
BASOPHILS # BLD: 1 % (ref 0–2)
BASOPHILS ABSOLUTE: 0.07 K/UL (ref 0–0.2)
BUN BLDV-MCNC: 39 MG/DL (ref 8–23)
CALCIUM SERPL-MCNC: 7.9 MG/DL (ref 8.6–10.4)
CHLORIDE BLD-SCNC: 93 MMOL/L (ref 98–107)
CO2: 27 MMOL/L (ref 20–31)
CREAT SERPL-MCNC: 5.88 MG/DL (ref 0.7–1.2)
EOSINOPHILS RELATIVE PERCENT: 12 % (ref 1–4)
ESTIMATED AVERAGE GLUCOSE: 157 MG/DL
GFR SERPL CREATININE-BSD FRML MDRD: 10 ML/MIN/1.73M2
GLUCOSE BLD-MCNC: 131 MG/DL (ref 70–99)
GLUCOSE BLD-MCNC: 139 MG/DL (ref 75–110)
GLUCOSE BLD-MCNC: 191 MG/DL (ref 75–110)
GLUCOSE BLD-MCNC: 232 MG/DL (ref 75–110)
HBA1C MFR BLD: 7.1 % (ref 4–6)
HCT VFR BLD CALC: 26.9 % (ref 40.7–50.3)
HEMOGLOBIN: 8.7 G/DL (ref 13–17)
IMMATURE GRANULOCYTES: 0 %
LYMPHOCYTES # BLD: 50 % (ref 24–43)
MCH RBC QN AUTO: 31.5 PG (ref 25.2–33.5)
MCHC RBC AUTO-ENTMCNC: 32.3 G/DL (ref 28.4–34.8)
MCV RBC AUTO: 97.5 FL (ref 82.6–102.9)
MONOCYTES # BLD: 10 % (ref 3–12)
NRBC AUTOMATED: 0 PER 100 WBC
PDW BLD-RTO: 13.2 % (ref 11.8–14.4)
PLATELET # BLD: 393 K/UL (ref 138–453)
PMV BLD AUTO: 8.8 FL (ref 8.1–13.5)
POTASSIUM SERPL-SCNC: 4.3 MMOL/L (ref 3.7–5.3)
RBC # BLD: 2.76 M/UL (ref 4.21–5.77)
SEG NEUTROPHILS: 27 % (ref 36–65)
SEGMENTED NEUTROPHILS ABSOLUTE COUNT: 1.55 K/UL (ref 1.5–8.1)
SODIUM BLD-SCNC: 134 MMOL/L (ref 135–144)
WBC # BLD: 5.7 K/UL (ref 3.5–11.3)

## 2022-11-21 PROCEDURE — 6370000000 HC RX 637 (ALT 250 FOR IP): Performed by: INTERNAL MEDICINE

## 2022-11-21 PROCEDURE — 6370000000 HC RX 637 (ALT 250 FOR IP)

## 2022-11-21 PROCEDURE — 99232 SBSQ HOSP IP/OBS MODERATE 35: CPT | Performed by: INTERNAL MEDICINE

## 2022-11-21 PROCEDURE — 80048 BASIC METABOLIC PNL TOTAL CA: CPT

## 2022-11-21 PROCEDURE — 36415 COLL VENOUS BLD VENIPUNCTURE: CPT

## 2022-11-21 PROCEDURE — 6370000000 HC RX 637 (ALT 250 FOR IP): Performed by: STUDENT IN AN ORGANIZED HEALTH CARE EDUCATION/TRAINING PROGRAM

## 2022-11-21 PROCEDURE — 85025 COMPLETE CBC W/AUTO DIFF WBC: CPT

## 2022-11-21 PROCEDURE — 1200000000 HC SEMI PRIVATE

## 2022-11-21 PROCEDURE — 6370000000 HC RX 637 (ALT 250 FOR IP): Performed by: PSYCHIATRY & NEUROLOGY

## 2022-11-21 PROCEDURE — 2580000003 HC RX 258: Performed by: STUDENT IN AN ORGANIZED HEALTH CARE EDUCATION/TRAINING PROGRAM

## 2022-11-21 PROCEDURE — 82947 ASSAY GLUCOSE BLOOD QUANT: CPT

## 2022-11-21 RX ADMIN — FERROUS SULFATE TAB EC 325 MG (65 MG FE EQUIVALENT) 325 MG: 325 (65 FE) TABLET DELAYED RESPONSE at 10:16

## 2022-11-21 RX ADMIN — AMLODIPINE BESYLATE 10 MG: 10 TABLET ORAL at 10:16

## 2022-11-21 RX ADMIN — CARVEDILOL 25 MG: 25 TABLET, FILM COATED ORAL at 18:35

## 2022-11-21 RX ADMIN — SODIUM CHLORIDE, PRESERVATIVE FREE 10 ML: 5 INJECTION INTRAVENOUS at 10:16

## 2022-11-21 RX ADMIN — INSULIN LISPRO 2 UNITS: 100 INJECTION, SOLUTION INTRAVENOUS; SUBCUTANEOUS at 12:21

## 2022-11-21 RX ADMIN — LURASIDONE HYDROCHLORIDE 40 MG: 40 TABLET, FILM COATED ORAL at 10:16

## 2022-11-21 RX ADMIN — ASPIRIN 81 MG: 81 TABLET, CHEWABLE ORAL at 10:16

## 2022-11-21 RX ADMIN — CARVEDILOL 25 MG: 25 TABLET, FILM COATED ORAL at 10:16

## 2022-11-21 NOTE — CARE COORDINATION
Met with pt this am.  Pt wishes to get to [place]. Explained to pt that SW working on getting him a bus ticket to [facility]. Duncan Adams as the [employer] does not go to  [place]. Advised pt that he will need a ride on Wed from [place] to [place] to get to dialysis. Pt stated he was going to call one of his friends to see if he could pick him up. Explained   Pt had dialysis and advised by Jourdan Gonzalez in dialysis that pt will not need again until Wed. Met with pt again - he stated he talked with his brother in law, Hood, and Hood wanting to know if he could be sent to [facility]. [place], where he lives. Pt advised that SW will contact Hood - called and left message. Issue is his dialysis is in [place] and would need arranged for [place] . Duncan Adams  Received c/b from [place] - staying with him is not an option at this time. He is trying to find a car for pt. Discussed pt going to a homeless shelter in [facility] stated that was pt's best option at this time. Hood stated that the [facility] to the [facility]. Informed Hood that will contact him later this afternoon to confirm plans. Called [place], RAGHAV bellamy, who will purchase the [employer]  The bus leaves at 6:30a each morning and arrives in [place] at 6:35am the following day.

## 2022-11-21 NOTE — CARE COORDINATION
Dialysis SW notified by inpatient SW of plan to have patient return to [place]. SW confirmed with Christclifton Slider that patient can return Wednesday for MWF 4:45pm chair.

## 2022-11-21 NOTE — PLAN OF CARE
Problem: ABCDS Injury Assessment  Goal: Absence of physical injury  11/21/2022 0539 by Benita Lindsey RN  Outcome: Progressing  Flowsheets (Taken 11/20/2022 1944)  Absence of Physical Injury: Implement safety measures based on patient assessment  11/20/2022 1633 by Mariano Tong RN  Outcome: Progressing     Problem: Safety - Adult  Goal: Free from fall injury  11/21/2022 0539 by Benita Lindsey RN  Outcome: Progressing  11/20/2022 1633 by Mariano Tong RN  Outcome: Progressing     Problem: Skin/Tissue Integrity  Goal: Absence of new skin breakdown  Description: 1. Monitor for areas of redness and/or skin breakdown  2. Assess vascular access sites hourly  3. Every 4-6 hours minimum:  Change oxygen saturation probe site  4. Every 4-6 hours:  If on nasal continuous positive airway pressure, respiratory therapy assess nares and determine need for appliance change or resting period.   11/21/2022 0539 by Benita Lindsey RN  Outcome: Progressing  11/20/2022 1633 by Mariano Tong RN  Outcome: Progressing     Problem: Chronic Conditions and Co-morbidities  Goal: Patient's chronic conditions and co-morbidity symptoms are monitored and maintained or improved  11/21/2022 0539 by Benita Lindsey RN  Outcome: Progressing  11/20/2022 1633 by Mariano Tong RN  Outcome: Progressing     Problem: Metabolic/Fluid and Electrolytes - Adult  Goal: Electrolytes maintained within normal limits  Outcome: Progressing  Goal: Hemodynamic stability and optimal renal function maintained  Outcome: Progressing

## 2022-11-21 NOTE — PROGRESS NOTES
Renal Progress Note    Patient :  Paul Johns; 73 y.o. MRN# [id number]  Location:  3597/2693-94  Attending:  Melisa Villalta MD  Admit Date:  11/13/2022   Hospital Day: 8      Subjective:       Patient is seen and examined at bedside. Patient tolerated hemodialysis yesterday with net removal of 1.5 L for 3.5 hours. He normally gets dialysis as per MWF schedule. No significant event overnight. Patient denies any symptoms. He has a urine output of 800 mL for the last 24 hours. Blood pressure is 150/74. Pulse 79. On room air. Labs reviewed and evaluated. Sodium 134 potassium 4.3 chloride 93. BUN 39, creatinine 5.88.  did confirm an outpatient dialysis spot at [facility] in [place] on MWF schedule. Next dialysis arranged for Wednesday at his outpatient unit. Last dialysis done yesterday 11/20/2022.  working towards placement. Outpatient Medications:     Medications Prior to Admission: aspirin 81 MG chewable tablet, Take 81 mg by mouth as needed for Pain    Current Medications:     Scheduled Meds:    fluticasone  1 spray Each Nostril Daily    lurasidone  40 mg Oral Daily    aspirin  81 mg Oral Daily    amLODIPine  10 mg Oral Daily    ferrous sulfate  325 mg Oral Daily with breakfast    carvedilol  25 mg Oral BID WC    sodium chloride flush  5-40 mL IntraVENous 2 times per day    heparin (porcine)  5,000 Units SubCUTAneous 3 times per day    insulin lispro  0-8 Units SubCUTAneous TID WC    insulin lispro  0-4 Units SubCUTAneous Nightly     Continuous Infusions:    dextrose      sodium chloride       PRN Meds:  hypromellose, glucose, dextrose bolus **OR** dextrose bolus, glucagon (rDNA), dextrose, sodium chloride flush, sodium chloride, ondansetron **OR** ondansetron, polyethylene glycol, acetaminophen **OR** acetaminophen, labetalol    Input/Output:       I/O last 3 completed shifts: In: 905 [P. O.:600; I.V.:5]  Out: 2600 [Urine:800].     Patient Vitals for the past 96 hrs (Last 3 readings):   Weight   22 1405 171 lb 15.3 oz (78 kg)   22 1021 175 lb 14.8 oz (79.8 kg)   22 1400 175 lb 4.3 oz (79.5 kg)       Vital Signs:   Temperature:  Temp: 98 °F (36.7 °C)  TMax:   Temp (24hrs), Av °F (36.7 °C), Min:97.7 °F (36.5 °C), Max:98.5 °F (36.9 °C)    Respirations:  Resp: 18  Pulse:   Heart Rate: 79  BP:    BP: (!) 149/74  BP Range: Systolic (16FYB), YMC:896 , Min:122 , UPL:093       Diastolic (25MER), IRB:27, Min:61, Max:89      Physical Examination:     General:  AAO x 3, speaking in full sentences, no accessory muscle use. HEENT: Atraumatic, normocephalic, no throat congestion, moist mucosa. Eyes:   Pupils equal, round and reactive to light, EOMI. Neck:   Supple  Chest:   Bilateral vesicular breath sounds, no rales or wheezes. Cardiac:  S1 S2 RR, no murmurs, gallops or rubs. Abdomen: Soft, non-tender, no masses or organomegaly, BS audible. :   No suprapubic or flank tenderness. Neuro:  AAO x 3, No FND. SKIN:  No rashes, good skin turgor. Extremities:  No edema. Labs:       Recent Labs     22  0539 22  0925 22  0722   WBC 5.8 6.1 5.7   RBC 2.91* 3.29* 2.76*   HGB 9.1* 10.2* 8.7*   HCT 29.1* 31.3* 26.9*   .0 95.1 97.5   MCH 31.3 31.0 31.5   MCHC 31.3 32.6 32.3   RDW 13.2 13.5 13.2    450 393   MPV 8.9 9.0 8.8        BMP:   Recent Labs     22  0539 22  0905 22  0722    139 134*   K 4.4 5.1 4.3   CL 97* 97* 93*   CO2 26 22 27   BUN 37* 63* 39*   CREATININE 5.89* 8.59* 5.88*   GLUCOSE 139* 163* 131*   CALCIUM 7.8* 7.9* 7.9*       SPEP:  Lab Results   Component Value Date/Time    PROT 7.6 2022 09:20 AM     Radiology:     Reviewed. Assessment:     ESRD on Hemodialysis. His regular HD days are  at [facility] Hemodialysis facility in [address] [place] [address] under Dr. Beverly Stanton via left upper arm fistula. Weakness. Nausea and vomiting. Resolved  Missed dialysis.   Anemia of chronic disease  Secondary hyperparathyroidism  Hypertension  Diabetes type 2  Hyperkalemia -improved    Plan:   Patient has hemodialysis yesterday as per modified holiday schedule. He will get his next hemodialysis on upcoming Tuesday. Usual HD schedule is MWF. Keep low potassium low phosphorus diet. Fluid restriction to 1500 mL/day.  did confirm an outpatient dialysis spot at [facility] in [place] on MWF schedule. Okay to discharge from nephrology standpoint. Nutrition   Please ensure that patient is on a renal diet/TF. Avoid nephrotoxic drugs/contrast exposure. Carolina Lopez MD  Internal Medicine Resident, PGY-2  [address]  65/08/4849,8:47 AM    Patient seen with resident. Known history of ESRD on hemodialysis Monday Wednesday Friday at the [facility] hemodialysis unit under Dr. Sandip Stover via left upper extremity AV fistula. Has been on dialysis for 2 years. He has been trying to get on the transplant list and was told by a physician in [place] that he needs to get  to get the on the transplant list according to him. He therefore came to [place] to see if he can get  here. He tells me he got scammed and missed his dialysis for a week. After that he came into the hospital with encephalopathy and uremia. Has received 5 treatments here last treatment was yesterday 11/20/2022. Had about 1.5 L removed. Outpatient dialysis being arranged at the [facility] unit starting Wednesday. At present he denies any complaints. Does make some urine. No shortness of breath orthopnea. Clinical examination shows left lower extremity edema 1+ right side no edema. Rest of exam unremarkable. Impression  1. ESRD on hemodialysis Monday Wednesday Friday at the [facility] unit via left upper extremity AV fistula  2. Uremia from missed dialysis  3. Type 2 diabetes  4. Anemia of chronic disease  Plan  1.   Patient stable for discharge  2. Next dialysis can be done on Wednesday at his local unit in [place]  3. No change in medication  4. 1.5 L fluid restriction  5. Potassium restriction as before  6. We will follow if he stays  Attending Physician Statement  I have discussed the care of Agata Hensley, including pertinent history and exam findings with the resident/fellow. I have reviewed the key elements of all parts of the encounter with the resident/fellow. I have seen and examined the patient with the resident/fellow. I agree with the assessment and plan and status of the problem list as documented.       .  Electronically signed by Leonela Hayes MD on 11/21/2022 at 1:22 PM

## 2022-11-21 NOTE — PROGRESS NOTES
Comprehensive Nutrition Assessment    Type and Reason for Visit:  Initial    Nutrition Recommendations/Plan:   Continue current diet as ordered. Continue to monitor intakes, labs, skin, weight and dialysis tolerance. Malnutrition Assessment:  Malnutrition Status:  No malnutrition (11/21/22 1311)    Context:  Chronic Illness     Findings of the 6 clinical characteristics of malnutrition:  Body Fat Loss:  No significant body fat loss     Muscle Mass Loss:  No significant muscle mass loss    Fluid Accumulation:  No significant fluid accumulation     Strength:  Not Performed    Nutrition Assessment:    Patient seen today for new admission due to ESRD with HD, metabolic encephalopathy, N/V. Admit 11/13, reported last HD x 1 week ago. Patient reports a good appetite, consuming %. Per patient, usual body weight 180 lbs. LBM 11/19. Trace edema noted. HD on 11/20, Pre weight 79.8kg (175.5 lb), post weight 78kg (171.6 lb), 1.5 L fluid removed, EDW 81 kg (178.2 lb). Patient reports he is tolerating HD at this time. Nutrition Related Findings:    Meds: iron, heparin, humalog, latuda; Labs: 11/21 glu 131, BUN 39, Cr 5.8, GFR 7.9, Na 134, Cl 93 Wound Type: None       Current Nutrition Intake & Therapies:    Average Meal Intake: %  Average Supplements Intake: None Ordered  ADULT DIET; Regular; 3 carb choices (45 gm/meal); Low Potassium (Less than 3000 mg/day); Low Phosphorus (Less than 1000 mg); 1200 ml    Anthropometric Measures:  Height: 5' 9.5\" (176.5 cm)  Ideal Body Weight (IBW): 163 lbs (74 kg)    Admission Body Weight: 176 lb (79.8 kg)  Current Body Weight: 171 lb 15.3 oz (78 kg),   IBW. Weight Source: Standing Scale  Current BMI (kg/m2): 25  Usual Body Weight: 180 lb (81.6 kg)  % Weight Change (Calculated): -4.5  Weight Adjustment For: No Adjustment  BMI Categories: Overweight (BMI 25.0-29. 9)    Estimated Daily Nutrient Needs:  Energy Requirements Based On: Kcal/kg  Weight Used for Energy Requirements: Current  Energy (kcal/day): 1950-2300kcal/day  Weight Used for Protein Requirements: Current  Protein (g/day): 95-120gmsPRO/day  Fluid (ml/day): 1200mL fluid restrction     Nutrition Diagnosis:   No nutrition diagnosis at this time   Nutrition Interventions:   Food and/or Nutrient Delivery: Continue Current Diet  Nutrition Education/Counseling: No recommendation at this time  Coordination of Nutrition Care: Continue to monitor while inpatient  Plan of Care discussed with: Patient    Goals:     Goals: Meet at least 75% of estimated needs, PO intake 50% or greater, prior to discharge       Nutrition Monitoring and Evaluation:   Behavioral-Environmental Outcomes: None Identified  Food/Nutrient Intake Outcomes: Food and Nutrient Intake  Physical Signs/Symptoms Outcomes: Biochemical Data, Skin, Weight, Fluid Status or Edema    Discharge Planning:    No discharge needs at this time     Sujey Vasquez RD  Contact: [phone]
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
## 2022-11-21 NOTE — CARE COORDINATION
Merlene Wilson has been purchased for pt to return to [facility]  [phone]) at [address] [facility] will pick pt up from hospital at 5:00a and transport to [employer] will arrive in [place] at [employer] on Wed morning     Pt given bus tickets (3):  [facility] [place], and [facility] ->[place]    Pt provided with bus itinerary    Pt provided with info for [employer], which is close to the bus stop, per brother in law. Pt given phone # for [facility] as he will need transportation to dialysis [facility]) Wed at 4:30. They are aware he will be there Wed as dialysis SW has contacted them. This is his home unit. Pt stated he is trying to reach a friend to see if he can take him. Called [place], brother in law again and updated on above    Reviewed above with pt. Clothes, coat/hat/gloves provided. RN updated and asked if  some food could be sent with pt    While in pt's room, asked if he had spoken to his brother Sanam Peña) or dtr. (Evelyne). Pt stated he has called his brother but he thinks his brother blocked him. Pt agreeable to SW calling. Called Nilda Morse and message left. Nilda Morse and his wife, Yen,called back. Both pt and SW spoke with them. Asked Nilda Morse if coming to [address] [place] [address] where he lived was an option and he stated no. Updated him on above. Pt had wrong phone # for Nilda Morse. Put Nilda Morse and [employer] phone #s in pt's phone contacts. Nilda Morse asked that pt keep him updated.

## 2022-11-21 NOTE — PLAN OF CARE
Problem: ABCDS Injury Assessment  Goal: Absence of physical injury  11/21/2022 1710 by Wandalee Ormond, RN  Outcome: Progressing  11/21/2022 0539 by Cody Payton RN  Outcome: Progressing  Flowsheets (Taken 11/20/2022 1944)  Absence of Physical Injury: Implement safety measures based on patient assessment     Problem: Safety - Adult  Goal: Free from fall injury  11/21/2022 1710 by Wandalee Ormond, RN  Outcome: Progressing  11/21/2022 0539 by Cody Payton RN  Outcome: Progressing     Problem: Skin/Tissue Integrity  Goal: Absence of new skin breakdown  Description: 1. Monitor for areas of redness and/or skin breakdown  2. Assess vascular access sites hourly  3. Every 4-6 hours minimum:  Change oxygen saturation probe site  4. Every 4-6 hours:  If on nasal continuous positive airway pressure, respiratory therapy assess nares and determine need for appliance change or resting period.   11/21/2022 1710 by Wandalee Ormond, RN  Outcome: Progressing  11/21/2022 0539 by Cody Payton RN  Outcome: Progressing     Problem: Chronic Conditions and Co-morbidities  Goal: Patient's chronic conditions and co-morbidity symptoms are monitored and maintained or improved  11/21/2022 1710 by Wandalee Ormond, RN  Outcome: Progressing  11/21/2022 0539 by Cody Payton RN  Outcome: Progressing     Problem: Metabolic/Fluid and Electrolytes - Adult  Goal: Electrolytes maintained within normal limits  11/21/2022 1710 by Wandalee Ormond, RN  Outcome: Progressing  11/21/2022 0539 by Cody Payton RN  Outcome: Progressing  Goal: Hemodynamic stability and optimal renal function maintained  11/21/2022 1710 by Wandalee Ormond, RN  Outcome: Progressing  11/21/2022 0539 by Cody Payton RN  Outcome: Progressing     Problem: Nutrition Deficit:  Goal: Optimize nutritional status  11/21/2022 1710 by Wandalee Ormond, RN  Outcome: Progressing  11/21/2022 1315 by Lorene Cherry RD  Flowsheets (Taken 11/21/2022 1315)  Nutrient intake appropriate for improving, restoring, or maintaining nutritional needs:   Assess nutritional status and recommend course of action   Recommend appropriate diets, oral nutritional supplements, and vitamin/mineral supplements   Monitor oral intake, labs, and treatment plans   Order, calculate, and assess calorie counts as needed

## 2022-11-22 RX ORDER — ASPIRIN 81 MG/1
81 TABLET, CHEWABLE ORAL AS NEEDED
Qty: 30 TABLET | Refills: 3 | Status: SHIPPED | OUTPATIENT
Start: 2022-11-22

## 2022-11-22 RX ORDER — GLIPIZIDE 5 MG/1
5 TABLET ORAL DAILY
Qty: 60 TABLET | Refills: 3 | Status: SHIPPED | OUTPATIENT
Start: 2022-11-22

## 2022-11-22 RX ORDER — CARVEDILOL 25 MG/1
25 TABLET ORAL 2 TIMES DAILY WITH MEALS
Qty: 60 TABLET | Refills: 3 | Status: SHIPPED | OUTPATIENT
Start: 2022-11-22

## 2022-11-22 RX ORDER — AMLODIPINE BESYLATE 10 MG/1
10 TABLET ORAL DAILY
Qty: 30 TABLET | Refills: 3 | Status: SHIPPED | OUTPATIENT
Start: 2022-11-22

## 2022-11-22 RX ORDER — LANOLIN ALCOHOL/MO/W.PET/CERES
325 CREAM (GRAM) TOPICAL
Qty: 90 TABLET | Refills: 3 | Status: SHIPPED | OUTPATIENT
Start: 2022-11-22

## 2022-11-22 NOTE — DISCHARGE SUMMARY
Patient was discharged with improve condition. IV cannula removed. Health education and medication instructions were provided. All his personal belongings was taken by him. Accompanied by wheelchair downstairs.

## 2022-11-22 NOTE — PLAN OF CARE
Problem: ABCDS Injury Assessment  Goal: Absence of physical injury  11/22/2022 0513 by Wild Chan RN  Outcome: Completed  Flowsheets (Taken 11/21/2022 1941)  Absence of Physical Injury: Implement safety measures based on patient assessment  11/21/2022 1710 by Evan Ventura RN  Outcome: Progressing     Problem: Safety - Adult  Goal: Free from fall injury  11/22/2022 0513 by Wild Chan RN  Outcome: Completed  11/21/2022 1710 by Evan Ventura RN  Outcome: Progressing     Problem: Skin/Tissue Integrity  Goal: Absence of new skin breakdown  Description: 1. Monitor for areas of redness and/or skin breakdown  2. Assess vascular access sites hourly  3. Every 4-6 hours minimum:  Change oxygen saturation probe site  4. Every 4-6 hours:  If on nasal continuous positive airway pressure, respiratory therapy assess nares and determine need for appliance change or resting period.   11/22/2022 0513 by Wild Chan RN  Outcome: Completed  11/21/2022 1710 by Evan Ventura RN  Outcome: Progressing     Problem: Chronic Conditions and Co-morbidities  Goal: Patient's chronic conditions and co-morbidity symptoms are monitored and maintained or improved  11/22/2022 0513 by Wild Chan RN  Outcome: Completed  11/21/2022 1710 by Evan Ventura RN  Outcome: Progressing     Problem: Metabolic/Fluid and Electrolytes - Adult  Goal: Electrolytes maintained within normal limits  11/22/2022 0513 by Wild Chan RN  Outcome: Completed  11/21/2022 1710 by Evan Ventura RN  Outcome: Progressing  Goal: Hemodynamic stability and optimal renal function maintained  11/22/2022 0513 by Wild Chan RN  Outcome: Completed  11/21/2022 1710 by Evan Ventura RN  Outcome: Progressing     Problem: Nutrition Deficit:  Goal: Optimize nutritional status  11/22/2022 0513 by Wild Chan RN  Outcome: Completed  11/21/2022 1710 by Evan Ventura RN  Outcome: Progressing

## 2022-11-22 NOTE — CARE COORDINATION
Received return message from dtr, Evelyne, as SW called her yest  Called Evelyne back this am.  Updated her on plans for her dad and that he left this am for [facility]. Evelyne stated they do not have contact with pt but that she appreciated SW contacting her and everything that was done for pt. Advised her that pt is scheduled to arrive in [facility] tomorrow at 6:30a.   Evelyne did inquire about pt's car -advised her that we have been unable to locate it but that [facility] was going to [place] to look for it

## 2022-12-01 NOTE — DISCHARGE SUMMARY
[address]     Department of Internal Medicine - Staff Internal Medicine Teaching Service    INPATIENT DISCHARGE SUMMARY      Patient Identification:  Alessandra Hummel is a 78 y.o. male. :  1955  MRN: [id number]     Acct: [de-identified]   PCP: No primary care provider on file. Admit Date:  2022  Discharge date and time: 2022  5:15 AM   Attending Provider: No att. providers found                                     [facility] Problem Lists:  Principal Problem:    Hyperkalemia  Active Problems:    Admission for dialysis ([facility] [address]    Hypervolemia    ESRD on dialysis ([facility] [address]    Acute metabolic encephalopathy    Uremic encephalopathy    Uremia    Primary hypertension    Insulin dependent type 2 diabetes mellitus (HCC)    Diabetic neuropathy (HCC)    Iron deficiency anemia    Elevated brain natriuretic peptide (BNP) level    Nausea and vomiting    Anemia of chronic disease  Resolved Problems:    * No resolved hospital problems. *      HOSPITAL STAY     Brief Inpatient course:   Alessandra Hummel is a 78 y.o. male who was admitted for the management of Hyperkalemia, presented to the emergency department with bilateral lower extremity weakness. Patient presented  in ED with the complaints of weakness bilaterally in legs and reported having visited [facility] for dialysis prior to coming to ED. He was altered and did not remember the events from past week clearly and reported travelling from [place], [place] to meet someone in [place]. Patient had PMH of diabetes mellitus with ESRD and had fistula in ante-cubital area. During presentation, his pro-BNP was increased along with blood glucose, Creatinine, and potassium levels. His GFR was 3. Nephro was consulted who planned his diaysis and medium dose sliding scale insulin was started for hyperglycemia. Insulin dextrose was given in ED for hyperkalemia initially.  He got his dialysis the same day after which he was much responsive. He was on MWF dialysis schedule. The patient expressed wish to go to [facility] and  was consulted. Patient also underwent echo which showed EF of 45 to 50%. Was started on Norvasc 10 mg and Coreg 25 mg twice daily for essential hypertension. Cardiology was also consulted who started patient on aspirin 81 mg daily and wanted to compare the previous cardiac records due to extensive cardiac history including CABG and essential hypertension. Psychiatry was consulted due to apparent altered mentation and they recommended no indication for admission to psychiatry and started on trial of Latuda 40 mg daily. Patient was then provided with information for MEDICINE [facility] [place] to [place] and [employer] tickets were purchased for the patient for return to [place]. Patient was then discharged.       Procedures/ Significant Interventions:    Echo  Dialysis    Consults:     Consults:     Final Specialist Recommendations/Findings:   IP CONSULT TO NEPHROLOGY  IP CONSULT TO INTERNAL MEDICINE  IP CONSULT TO CASE MANAGEMENT  IP CONSULT TO SOCIAL WORK  IP CONSULT TO CARDIOLOGY  IP CONSULT TO PSYCHIATRY      Any Hospital Acquired Infections: none    Discharge Functional Status:  stable    DISCHARGE PLAN     Disposition: Homeless shelter in [place]    Patient Instructions:   Discharge Medication List as of 11/22/2022  3:14 AM        START taking these medications    Details   amLODIPine (NORVASC) 10 MG tablet Take 1 tablet by mouth daily, Disp-30 tablet, R-3Normal      carvedilol (COREG) 25 MG tablet Take 1 tablet by mouth 2 times daily (with meals), Disp-60 tablet, R-3Normal      ferrous sulfate (FE TABS 325) 325 (65 Fe) MG EC tablet Take 1 tablet by mouth daily (with breakfast), Disp-90 tablet, R-3Normal      lurasidone (LATUDA) 40 MG TABS tablet Take 1 tablet by mouth daily, Disp-30 tablet, R-2Normal      glipiZIDE (GLUCOTROL) 5 MG tablet Take 1 tablet by mouth daily, Disp-60 tablet, R-3Normal           CONTINUE these medications which have NOT CHANGED    Details   aspirin 81 MG chewable tablet Take 81 mg by mouth as needed for PainHistorical Med             Activity: activity as tolerated    Diet: renal diet    Follow-up:    No follow-up provider specified. Patient Instructions: You were seen and admitted you were seen and admitted for altered mentation due to missed dialysis. Please resume your dialysis in [place] as per  Monday Wednesday Friday schedule. Please take your medications as prescribed  please return if you have any worsening of your symptoms      Note that over 30 minutes was spent in preparing discharge papers, discussing discharge with patient, medication review, etc.      Marika Hall MD, MD  Internal Medicine Resident, PGY-1  [facility];  [place], [place]  12/1/2022, 7:54 AM